# Patient Record
Sex: FEMALE | Race: OTHER | HISPANIC OR LATINO | URBAN - METROPOLITAN AREA
[De-identification: names, ages, dates, MRNs, and addresses within clinical notes are randomized per-mention and may not be internally consistent; named-entity substitution may affect disease eponyms.]

---

## 2020-04-06 ENCOUNTER — INPATIENT (INPATIENT)
Facility: HOSPITAL | Age: 70
LOS: 3 days | Discharge: ANOTHER HOSPITAL | DRG: 871 | End: 2020-04-10
Attending: STUDENT IN AN ORGANIZED HEALTH CARE EDUCATION/TRAINING PROGRAM | Admitting: STUDENT IN AN ORGANIZED HEALTH CARE EDUCATION/TRAINING PROGRAM
Payer: MEDICARE

## 2020-04-06 VITALS
HEART RATE: 116 BPM | OXYGEN SATURATION: 85 % | SYSTOLIC BLOOD PRESSURE: 151 MMHG | TEMPERATURE: 99 F | RESPIRATION RATE: 22 BRPM | DIASTOLIC BLOOD PRESSURE: 87 MMHG

## 2020-04-06 DIAGNOSIS — I10 ESSENTIAL (PRIMARY) HYPERTENSION: ICD-10-CM

## 2020-04-06 DIAGNOSIS — U07.1 COVID-19: ICD-10-CM

## 2020-04-06 DIAGNOSIS — J96.01 ACUTE RESPIRATORY FAILURE WITH HYPOXIA: ICD-10-CM

## 2020-04-06 DIAGNOSIS — Z29.9 ENCOUNTER FOR PROPHYLACTIC MEASURES, UNSPECIFIED: ICD-10-CM

## 2020-04-06 DIAGNOSIS — E11.9 TYPE 2 DIABETES MELLITUS WITHOUT COMPLICATIONS: ICD-10-CM

## 2020-04-06 LAB
ALBUMIN SERPL ELPH-MCNC: 3.2 G/DL — LOW (ref 3.3–5)
ALP SERPL-CCNC: 72 U/L — SIGNIFICANT CHANGE UP (ref 40–120)
ALT FLD-CCNC: 35 U/L — SIGNIFICANT CHANGE UP (ref 10–45)
ANION GAP SERPL CALC-SCNC: 16 MMOL/L — SIGNIFICANT CHANGE UP (ref 5–17)
APTT BLD: 26.2 SEC — LOW (ref 27.5–36.3)
AST SERPL-CCNC: 35 U/L — SIGNIFICANT CHANGE UP (ref 10–40)
BASE EXCESS BLDV CALC-SCNC: 0.2 MMOL/L — SIGNIFICANT CHANGE UP
BASOPHILS # BLD AUTO: 0.01 K/UL — SIGNIFICANT CHANGE UP (ref 0–0.2)
BASOPHILS NFR BLD AUTO: 0.1 % — SIGNIFICANT CHANGE UP (ref 0–2)
BILIRUB SERPL-MCNC: 0.6 MG/DL — SIGNIFICANT CHANGE UP (ref 0.2–1.2)
BUN SERPL-MCNC: 13 MG/DL — SIGNIFICANT CHANGE UP (ref 7–23)
CA-I SERPL-SCNC: 1.12 MMOL/L — SIGNIFICANT CHANGE UP (ref 1.12–1.3)
CALCIUM SERPL-MCNC: 8.8 MG/DL — SIGNIFICANT CHANGE UP (ref 8.4–10.5)
CHLORIDE SERPL-SCNC: 93 MMOL/L — LOW (ref 96–108)
CK SERPL-CCNC: 76 U/L — SIGNIFICANT CHANGE UP (ref 25–170)
CO2 SERPL-SCNC: 21 MMOL/L — LOW (ref 22–31)
CREAT SERPL-MCNC: 0.59 MG/DL — SIGNIFICANT CHANGE UP (ref 0.5–1.3)
CRP SERPL-MCNC: 20.95 MG/DL — HIGH (ref 0–0.4)
D DIMER BLD IA.RAPID-MCNC: 912 NG/ML DDU — HIGH
EOSINOPHIL # BLD AUTO: 0 K/UL — SIGNIFICANT CHANGE UP (ref 0–0.5)
EOSINOPHIL NFR BLD AUTO: 0 % — SIGNIFICANT CHANGE UP (ref 0–6)
ERYTHROCYTE [SEDIMENTATION RATE] IN BLOOD: 50 MM/HR — HIGH
FERRITIN SERPL-MCNC: 1122 NG/ML — HIGH (ref 15–150)
GAS PNL BLDV: 130 MMOL/L — LOW (ref 138–146)
GAS PNL BLDV: SIGNIFICANT CHANGE UP
GLUCOSE BLDC GLUCOMTR-MCNC: 312 MG/DL — HIGH (ref 70–99)
GLUCOSE BLDC GLUCOMTR-MCNC: 337 MG/DL — HIGH (ref 70–99)
GLUCOSE BLDC GLUCOMTR-MCNC: 347 MG/DL — HIGH (ref 70–99)
GLUCOSE SERPL-MCNC: 426 MG/DL — HIGH (ref 70–99)
HCO3 BLDV-SCNC: 23 MMOL/L — SIGNIFICANT CHANGE UP (ref 20–27)
HCT VFR BLD CALC: 45.1 % — HIGH (ref 34.5–45)
HGB BLD-MCNC: 14.8 G/DL — SIGNIFICANT CHANGE UP (ref 11.5–15.5)
IMM GRANULOCYTES NFR BLD AUTO: 0.5 % — SIGNIFICANT CHANGE UP (ref 0–1.5)
INR BLD: 1.15 — SIGNIFICANT CHANGE UP (ref 0.88–1.16)
LACTATE SERPL-SCNC: 2.1 MMOL/L — HIGH (ref 0.5–2)
LDH SERPL L TO P-CCNC: SIGNIFICANT CHANGE UP U/L (ref 50–242)
LYMPHOCYTES # BLD AUTO: 0.67 K/UL — LOW (ref 1–3.3)
LYMPHOCYTES # BLD AUTO: 6.9 % — LOW (ref 13–44)
MCHC RBC-ENTMCNC: 27.7 PG — SIGNIFICANT CHANGE UP (ref 27–34)
MCHC RBC-ENTMCNC: 32.8 GM/DL — SIGNIFICANT CHANGE UP (ref 32–36)
MCV RBC AUTO: 84.5 FL — SIGNIFICANT CHANGE UP (ref 80–100)
MONOCYTES # BLD AUTO: 0.16 K/UL — SIGNIFICANT CHANGE UP (ref 0–0.9)
MONOCYTES NFR BLD AUTO: 1.6 % — LOW (ref 2–14)
NEUTROPHILS # BLD AUTO: 8.87 K/UL — HIGH (ref 1.8–7.4)
NEUTROPHILS NFR BLD AUTO: 90.9 % — HIGH (ref 43–77)
NRBC # BLD: 0 /100 WBCS — SIGNIFICANT CHANGE UP (ref 0–0)
NT-PROBNP SERPL-SCNC: 270 PG/ML — SIGNIFICANT CHANGE UP (ref 0–300)
PCO2 BLDV: 33 MMHG — LOW (ref 41–51)
PH BLDV: 7.46 — HIGH (ref 7.32–7.43)
PLATELET # BLD AUTO: 228 K/UL — SIGNIFICANT CHANGE UP (ref 150–400)
PO2 BLDV: 34 MMHG — SIGNIFICANT CHANGE UP
POTASSIUM BLDV-SCNC: 3.9 MMOL/L — SIGNIFICANT CHANGE UP (ref 3.5–4.9)
POTASSIUM SERPL-MCNC: 4.5 MMOL/L — SIGNIFICANT CHANGE UP (ref 3.5–5.3)
POTASSIUM SERPL-SCNC: 4.5 MMOL/L — SIGNIFICANT CHANGE UP (ref 3.5–5.3)
PROT SERPL-MCNC: 7.1 G/DL — SIGNIFICANT CHANGE UP (ref 6–8.3)
PROTHROM AB SERPL-ACNC: 13.2 SEC — HIGH (ref 10–12.9)
RBC # BLD: 5.34 M/UL — HIGH (ref 3.8–5.2)
RBC # FLD: 12.8 % — SIGNIFICANT CHANGE UP (ref 10.3–14.5)
SAO2 % BLDV: 66 % — SIGNIFICANT CHANGE UP
SARS-COV-2 RNA SPEC QL NAA+PROBE: DETECTED
SODIUM SERPL-SCNC: 130 MMOL/L — LOW (ref 135–145)
TROPONIN T SERPL-MCNC: <0.01 NG/ML — SIGNIFICANT CHANGE UP (ref 0–0.01)
WBC # BLD: 9.76 K/UL — SIGNIFICANT CHANGE UP (ref 3.8–10.5)
WBC # FLD AUTO: 9.76 K/UL — SIGNIFICANT CHANGE UP (ref 3.8–10.5)

## 2020-04-06 PROCEDURE — 99285 EMERGENCY DEPT VISIT HI MDM: CPT | Mod: 25

## 2020-04-06 PROCEDURE — 99233 SBSQ HOSP IP/OBS HIGH 50: CPT | Mod: GC

## 2020-04-06 PROCEDURE — 71045 X-RAY EXAM CHEST 1 VIEW: CPT | Mod: 26

## 2020-04-06 PROCEDURE — 93010 ELECTROCARDIOGRAM REPORT: CPT

## 2020-04-06 RX ORDER — HYDROXYCHLOROQUINE SULFATE 200 MG
400 TABLET ORAL EVERY 12 HOURS
Refills: 0 | Status: COMPLETED | OUTPATIENT
Start: 2020-04-06 | End: 2020-04-07

## 2020-04-06 RX ORDER — DEXTROSE 50 % IN WATER 50 %
15 SYRINGE (ML) INTRAVENOUS ONCE
Refills: 0 | Status: DISCONTINUED | OUTPATIENT
Start: 2020-04-06 | End: 2020-04-10

## 2020-04-06 RX ORDER — INSULIN LISPRO 100/ML
VIAL (ML) SUBCUTANEOUS
Refills: 0 | Status: DISCONTINUED | OUTPATIENT
Start: 2020-04-06 | End: 2020-04-10

## 2020-04-06 RX ORDER — SODIUM CHLORIDE 9 MG/ML
500 INJECTION INTRAMUSCULAR; INTRAVENOUS; SUBCUTANEOUS ONCE
Refills: 0 | Status: COMPLETED | OUTPATIENT
Start: 2020-04-06 | End: 2020-04-06

## 2020-04-06 RX ORDER — FAMOTIDINE 10 MG/ML
40 INJECTION INTRAVENOUS
Refills: 0 | Status: DISCONTINUED | OUTPATIENT
Start: 2020-04-06 | End: 2020-04-06

## 2020-04-06 RX ORDER — ACETAMINOPHEN 500 MG
650 TABLET ORAL EVERY 4 HOURS
Refills: 0 | Status: DISCONTINUED | OUTPATIENT
Start: 2020-04-06 | End: 2020-04-10

## 2020-04-06 RX ORDER — AZITHROMYCIN 500 MG/1
500 TABLET, FILM COATED ORAL ONCE
Refills: 0 | Status: COMPLETED | OUTPATIENT
Start: 2020-04-06 | End: 2020-04-06

## 2020-04-06 RX ORDER — GLUCAGON INJECTION, SOLUTION 0.5 MG/.1ML
1 INJECTION, SOLUTION SUBCUTANEOUS ONCE
Refills: 0 | Status: DISCONTINUED | OUTPATIENT
Start: 2020-04-06 | End: 2020-04-10

## 2020-04-06 RX ORDER — HYDROXYCHLOROQUINE SULFATE 200 MG
TABLET ORAL
Refills: 0 | Status: DISCONTINUED | OUTPATIENT
Start: 2020-04-06 | End: 2020-04-10

## 2020-04-06 RX ORDER — ENOXAPARIN SODIUM 100 MG/ML
40 INJECTION SUBCUTANEOUS DAILY
Refills: 0 | Status: DISCONTINUED | OUTPATIENT
Start: 2020-04-06 | End: 2020-04-07

## 2020-04-06 RX ORDER — DEXTROSE 50 % IN WATER 50 %
25 SYRINGE (ML) INTRAVENOUS ONCE
Refills: 0 | Status: DISCONTINUED | OUTPATIENT
Start: 2020-04-06 | End: 2020-04-10

## 2020-04-06 RX ORDER — FAMOTIDINE 10 MG/ML
20 INJECTION INTRAVENOUS
Refills: 0 | Status: DISCONTINUED | OUTPATIENT
Start: 2020-04-06 | End: 2020-04-10

## 2020-04-06 RX ORDER — ACETAMINOPHEN 500 MG
650 TABLET ORAL ONCE
Refills: 0 | Status: COMPLETED | OUTPATIENT
Start: 2020-04-06 | End: 2020-04-06

## 2020-04-06 RX ORDER — DEXTROSE 50 % IN WATER 50 %
12.5 SYRINGE (ML) INTRAVENOUS ONCE
Refills: 0 | Status: DISCONTINUED | OUTPATIENT
Start: 2020-04-06 | End: 2020-04-10

## 2020-04-06 RX ORDER — AZITHROMYCIN 500 MG/1
250 TABLET, FILM COATED ORAL DAILY
Refills: 0 | Status: COMPLETED | OUTPATIENT
Start: 2020-04-07 | End: 2020-04-10

## 2020-04-06 RX ORDER — HYDROXYCHLOROQUINE SULFATE 200 MG
200 TABLET ORAL EVERY 12 HOURS
Refills: 0 | Status: DISCONTINUED | OUTPATIENT
Start: 2020-04-07 | End: 2020-04-10

## 2020-04-06 RX ORDER — AZITHROMYCIN 500 MG/1
250 TABLET, FILM COATED ORAL DAILY
Refills: 0 | Status: DISCONTINUED | OUTPATIENT
Start: 2020-04-06 | End: 2020-04-06

## 2020-04-06 RX ORDER — SODIUM CHLORIDE 9 MG/ML
1000 INJECTION, SOLUTION INTRAVENOUS
Refills: 0 | Status: DISCONTINUED | OUTPATIENT
Start: 2020-04-06 | End: 2020-04-10

## 2020-04-06 RX ADMIN — Medication 400 MILLIGRAM(S): at 19:28

## 2020-04-06 RX ADMIN — FAMOTIDINE 20 MILLIGRAM(S): 10 INJECTION INTRAVENOUS at 19:28

## 2020-04-06 RX ADMIN — AZITHROMYCIN 500 MILLIGRAM(S): 500 TABLET, FILM COATED ORAL at 19:46

## 2020-04-06 RX ADMIN — Medication 8: at 20:30

## 2020-04-06 RX ADMIN — Medication 650 MILLIGRAM(S): at 15:01

## 2020-04-06 RX ADMIN — SODIUM CHLORIDE 500 MILLILITER(S): 9 INJECTION INTRAMUSCULAR; INTRAVENOUS; SUBCUTANEOUS at 17:10

## 2020-04-06 RX ADMIN — SODIUM CHLORIDE 500 MILLILITER(S): 9 INJECTION INTRAMUSCULAR; INTRAVENOUS; SUBCUTANEOUS at 19:28

## 2020-04-06 RX ADMIN — AZITHROMYCIN 255 MILLIGRAM(S): 500 TABLET, FILM COATED ORAL at 17:10

## 2020-04-06 NOTE — H&P ADULT - HISTORY OF PRESENT ILLNESS
HPI:  70 yo F PMHx HTN and DM (not on any meds) presents to the ED with c/o 10 D of generalized weakness, loss of appetite, generalized malaise, and subjective fevers.  She also reports nonbloody diarrhea.    Date of onset of fever: 10 days  Date of onset of dyspnea:  Recent Travel:  Sick Contacts:  COVID Exposure:  Close Contacts: HPI:  70 yo F PMHx HTN and DM (not on any meds) presents to the ED with c/o 10 D of generalized weakness, loss of appetite, generalized malaise, and subjective fevers.  She also reports nonbloody diarrhea and abdominal discomfort.  Denies cough.    Date of onset of fever: 10 days  Date of onset of dyspnea: denies  Recent Travel: none  Sick Contacts: none  COVID Exposure: none

## 2020-04-06 NOTE — ED PROVIDER NOTE - DIAGNOSTIC INTERPRETATION
ER Physician: June Ree  CHEST XRAY INTERPRETATION: Air space opacity noted bilaterally. L upper lobe consolidation noted. Heart size normal. No acute bony injury. ER Physician: June Ree  CHEST XRAY INTERPRETATION: Air space opacities noted bilaterally. L upper lobe consolidation noted. Heart size normal. No acute bony injury.

## 2020-04-06 NOTE — H&P ADULT - ASSESSMENT
70 yo F PMHx HTN and DM (not on any meds) presents to the ED with c/o 10 D of generalized weakness, loss of appetite, generalized malaise, and subjective fevers admitted for hypoxic respiratory failure 2/2 COVID 19.

## 2020-04-06 NOTE — ED ADULT NURSE NOTE - OBJECTIVE STATEMENT
The pt is a 70 y/o female came in to ED for evaluation of fever, cough and SOB for one week. Pt noted to be hypoxic at triage O2sat 88 at room air, O2at improved 96% on 4L nasal canula.

## 2020-04-06 NOTE — H&P ADULT - NSHPLABSRESULTS_GEN_ALL_CORE
LABS:                         14.8   9.76  )-----------( 228      ( 06 Apr 2020 15:45 )             45.1     04-06    130<L>  |  93<L>  |  13  ----------------------------<  426<H>  4.5   |  21<L>  |  0.59    Ca    8.8      06 Apr 2020 15:45    TPro  7.1  /  Alb  3.2<L>  /  TBili  0.6  /  DBili  x   /  AST  35  /  ALT  35  /  AlkPhos  72  04-06    PT/INR - ( 06 Apr 2020 15:45 )   PT: 13.2 sec;   INR: 1.15          PTT - ( 06 Apr 2020 15:45 )  PTT:26.2 sec    CARDIAC MARKERS ( 06 Apr 2020 15:45 )  x     / <0.01 ng/mL / 76 U/L / x     / x          Serum Pro-Brain Natriuretic Peptide: 270 pg/mL (04-06 @ 15:45)    Lactate, Blood: 2.1 mmol/L (04-06 @ 15:45)      RADIOLOGY, EKG & ADDITIONAL TESTS: Reviewed.

## 2020-04-06 NOTE — H&P ADULT - PROBLEM SELECTOR PLAN 2
Currently comfortable on 3L NC   - Monitor for respiratory distress and O2 saturations  - Nasal Cannula as needed

## 2020-04-06 NOTE — H&P ADULT - PROBLEM SELECTOR PLAN 1
- Supportive care   - Tylenol 650mg for fevers   - Isolation precautions; droplet and contact    - f/u Procalcitonin, Quant, G6PD  - CRP 20.95, Ferritin 1122, DDimer 912  - Start Plaquenil 400mg BID for 2 doses then 200mg BID for 4  - Start Azithromycin 250mg for 5 days

## 2020-04-06 NOTE — ED PROVIDER NOTE - CLINICAL SUMMARY MEDICAL DECISION MAKING FREE TEXT BOX
70 y/o F PMHx HTN and DM (not on any meds) p/w 10 D of generalized weakness, loss of appetite, generalized malaise, and subjective fevers. Pt in ED febrile to 101.9F, mildly tachypneic, and hypoxic to 85% on RA. In triage pt came up to mid 90s on 4L of nasal canula. Plan is COVID workup and admission since pt hypoxic at rest. 70 y/o F PMHx HTN and pre-diabetes (not on any meds) p/w 10 days of generalized weakness, loss of appetite, generalized malaise, and subjective fevers. Pt in ED is febrile to 101.9F, mildly tachypneic, and hypoxic to 85% on RA. In triage pt came up to mid 90s on 4L of nasal canula. Plan is COVID workup and admission since pt hypoxic at rest.  ED course: Labs noted. CXR with b/l infiltrates and opacities. IV abx. given. COVID 19 sent. Pt admitted as she hypoxic to 85% on RA. O2 sats improved to mid 90s on 4L NC. Pt is not in any resp distress. Stable for admission to floor.

## 2020-04-06 NOTE — H&P ADULT - NSHPPHYSICALEXAM_GEN_ALL_CORE
.VITAL SIGNS:  T(C): 38.8 (04-06-20 @ 15:16), Max: 39.7 (04-06-20 @ 15:00)  T(F): 101.9 (04-06-20 @ 15:16), Max: 103.4 (04-06-20 @ 15:00)  HR: 105 (04-06-20 @ 15:16) (105 - 116)  BP: 133/83 (04-06-20 @ 15:16) (133/83 - 151/87)  BP(mean): --  RR: 20 (04-06-20 @ 15:16) (20 - 22)  SpO2: 95% (04-06-20 @ 15:16) (85% - 95%)  Wt(kg): --    PHYSICAL EXAM:    Constitutional: WDWN resting comfortably in bed; NAD  Head: NC/AT  Eyes: PERRL, EOMI, clear conjunctiva  ENT: no nasal discharge; uvula midline, no oropharyngeal erythema or exudates; MMM  Neck: supple; no JVD or thyromegaly  Respiratory: CTA B/L; no W/R/R, no retractions  Cardiac: +S1/S2; RRR; no M/R/G; PMI non-displaced  Gastrointestinal: soft, NT/ND; no rebound or guarding; +BSx4  Genitourinary: normal external genitalia  Back: spine midline, no bony tenderness or step-offs; no CVAT B/L  Extremities: WWP, no clubbing or cyanosis; no peripheral edema  Musculoskeletal: NROM x4; no joint swelling, tenderness or erythema  Vascular: 2+ radial, femoral, DP/PT pulses B/L  Dermatologic: skin warm, dry and intact; no rashes, wounds, or scars  Lymphatic: no submandibular or cervical LAD  Neurologic: AAOx3; CNII-XII grossly intact; no focal deficits  Psychiatric: affect and characteristics of appearance, verbalizations, behaviors are appropriate .VITAL SIGNS:  T(C): 38.8 (04-06-20 @ 15:16), Max: 39.7 (04-06-20 @ 15:00)  T(F): 101.9 (04-06-20 @ 15:16), Max: 103.4 (04-06-20 @ 15:00)  HR: 105 (04-06-20 @ 15:16) (105 - 116)  BP: 133/83 (04-06-20 @ 15:16) (133/83 - 151/87)  BP(mean): --  RR: 20 (04-06-20 @ 15:16) (20 - 22)  SpO2: 95% (04-06-20 @ 15:16) (85% - 95%)  Wt(kg): --    PHYSICAL EXAM:    Constitutional: Obese habitus, resting comfortably in bed; NAD  Head: NC/AT  Eyes: PERRL, EOMI, clear conjunctiva  ENT: no nasal discharge; uvula midline, no oropharyngeal erythema or exudates; MMM  Neck: supple; no JVD or thyromegaly  Respiratory: CTA B/L; no W/R/R, no retractions  Cardiac: +S1/S2; RRR; no M/R/G; PMI non-displaced  Gastrointestinal: soft, NT/ND; no rebound or guarding; +BSx4  Genitourinary: normal external genitalia  Back: spine midline, no bony tenderness or step-offs; no CVAT B/L  Extremities: WWP, no clubbing or cyanosis; no peripheral edema  Musculoskeletal: NROM x4; no joint swelling, tenderness or erythema  Vascular: 2+ radial, femoral, DP/PT pulses B/L  Dermatologic: skin warm, dry and intact; no rashes, wounds, or scars  Lymphatic: no submandibular or cervical LAD  Neurologic: AAOx3; CNII-XII grossly intact; no focal deficits  Psychiatric: affect and characteristics of appearance, verbalizations, behaviors are appropriate

## 2020-04-07 LAB
ALBUMIN SERPL ELPH-MCNC: 3.1 G/DL — LOW (ref 3.3–5)
ALP SERPL-CCNC: 69 U/L — SIGNIFICANT CHANGE UP (ref 40–120)
ALT FLD-CCNC: 29 U/L — SIGNIFICANT CHANGE UP (ref 10–45)
ANION GAP SERPL CALC-SCNC: 13 MMOL/L — SIGNIFICANT CHANGE UP (ref 5–17)
AST SERPL-CCNC: 31 U/L — SIGNIFICANT CHANGE UP (ref 10–40)
BASOPHILS # BLD AUTO: 0 K/UL — SIGNIFICANT CHANGE UP (ref 0–0.2)
BASOPHILS NFR BLD AUTO: 0 % — SIGNIFICANT CHANGE UP (ref 0–2)
BILIRUB SERPL-MCNC: 0.6 MG/DL — SIGNIFICANT CHANGE UP (ref 0.2–1.2)
BUN SERPL-MCNC: 12 MG/DL — SIGNIFICANT CHANGE UP (ref 7–23)
CALCIUM SERPL-MCNC: 8.8 MG/DL — SIGNIFICANT CHANGE UP (ref 8.4–10.5)
CHLORIDE SERPL-SCNC: 98 MMOL/L — SIGNIFICANT CHANGE UP (ref 96–108)
CO2 SERPL-SCNC: 24 MMOL/L — SIGNIFICANT CHANGE UP (ref 22–31)
CREAT SERPL-MCNC: 0.53 MG/DL — SIGNIFICANT CHANGE UP (ref 0.5–1.3)
CRP SERPL-MCNC: 21.06 MG/DL — HIGH (ref 0–0.4)
D DIMER BLD IA.RAPID-MCNC: 832 NG/ML DDU — HIGH
EOSINOPHIL # BLD AUTO: 0 K/UL — SIGNIFICANT CHANGE UP (ref 0–0.5)
EOSINOPHIL NFR BLD AUTO: 0 % — SIGNIFICANT CHANGE UP (ref 0–6)
FERRITIN SERPL-MCNC: 1066 NG/ML — HIGH (ref 15–150)
GLUCOSE BLDC GLUCOMTR-MCNC: 202 MG/DL — HIGH (ref 70–99)
GLUCOSE BLDC GLUCOMTR-MCNC: 281 MG/DL — HIGH (ref 70–99)
GLUCOSE BLDC GLUCOMTR-MCNC: 287 MG/DL — HIGH (ref 70–99)
GLUCOSE BLDC GLUCOMTR-MCNC: 362 MG/DL — HIGH (ref 70–99)
GLUCOSE SERPL-MCNC: 338 MG/DL — HIGH (ref 70–99)
HBA1C BLD-MCNC: 13.2 % — HIGH (ref 4–5.6)
HCT VFR BLD CALC: 43.5 % — SIGNIFICANT CHANGE UP (ref 34.5–45)
HCV AB S/CO SERPL IA: 0.14 S/CO — SIGNIFICANT CHANGE UP
HCV AB SERPL-IMP: SIGNIFICANT CHANGE UP
HGB BLD-MCNC: 14.1 G/DL — SIGNIFICANT CHANGE UP (ref 11.5–15.5)
IMM GRANULOCYTES NFR BLD AUTO: 0.6 % — SIGNIFICANT CHANGE UP (ref 0–1.5)
LYMPHOCYTES # BLD AUTO: 1.42 K/UL — SIGNIFICANT CHANGE UP (ref 1–3.3)
LYMPHOCYTES # BLD AUTO: 14.1 % — SIGNIFICANT CHANGE UP (ref 13–44)
MAGNESIUM SERPL-MCNC: 2.4 MG/DL — SIGNIFICANT CHANGE UP (ref 1.6–2.6)
MCHC RBC-ENTMCNC: 28.1 PG — SIGNIFICANT CHANGE UP (ref 27–34)
MCHC RBC-ENTMCNC: 32.4 GM/DL — SIGNIFICANT CHANGE UP (ref 32–36)
MCV RBC AUTO: 86.7 FL — SIGNIFICANT CHANGE UP (ref 80–100)
MONOCYTES # BLD AUTO: 0.2 K/UL — SIGNIFICANT CHANGE UP (ref 0–0.9)
MONOCYTES NFR BLD AUTO: 2 % — SIGNIFICANT CHANGE UP (ref 2–14)
NEUTROPHILS # BLD AUTO: 8.38 K/UL — HIGH (ref 1.8–7.4)
NEUTROPHILS NFR BLD AUTO: 83.3 % — HIGH (ref 43–77)
NRBC # BLD: 0 /100 WBCS — SIGNIFICANT CHANGE UP (ref 0–0)
PHOSPHATE SERPL-MCNC: 2.1 MG/DL — LOW (ref 2.5–4.5)
PLATELET # BLD AUTO: 254 K/UL — SIGNIFICANT CHANGE UP (ref 150–400)
POTASSIUM SERPL-MCNC: 3.7 MMOL/L — SIGNIFICANT CHANGE UP (ref 3.5–5.3)
POTASSIUM SERPL-SCNC: 3.7 MMOL/L — SIGNIFICANT CHANGE UP (ref 3.5–5.3)
PROCALCITONIN SERPL-MCNC: 0.35 NG/ML — HIGH (ref 0.02–0.1)
PROT SERPL-MCNC: 6.6 G/DL — SIGNIFICANT CHANGE UP (ref 6–8.3)
RBC # BLD: 5.02 M/UL — SIGNIFICANT CHANGE UP (ref 3.8–5.2)
RBC # FLD: 13.1 % — SIGNIFICANT CHANGE UP (ref 10.3–14.5)
SODIUM SERPL-SCNC: 135 MMOL/L — SIGNIFICANT CHANGE UP (ref 135–145)
WBC # BLD: 10.06 K/UL — SIGNIFICANT CHANGE UP (ref 3.8–10.5)
WBC # FLD AUTO: 10.06 K/UL — SIGNIFICANT CHANGE UP (ref 3.8–10.5)

## 2020-04-07 PROCEDURE — 99233 SBSQ HOSP IP/OBS HIGH 50: CPT | Mod: GC

## 2020-04-07 RX ORDER — HUMAN INSULIN 100 [IU]/ML
10 INJECTION, SUSPENSION SUBCUTANEOUS ONCE
Refills: 0 | Status: DISCONTINUED | OUTPATIENT
Start: 2020-04-07 | End: 2020-04-07

## 2020-04-07 RX ORDER — POTASSIUM CHLORIDE 20 MEQ
20 PACKET (EA) ORAL ONCE
Refills: 0 | Status: COMPLETED | OUTPATIENT
Start: 2020-04-07 | End: 2020-04-07

## 2020-04-07 RX ORDER — HUMAN INSULIN 100 [IU]/ML
8 INJECTION, SUSPENSION SUBCUTANEOUS ONCE
Refills: 0 | Status: COMPLETED | OUTPATIENT
Start: 2020-04-07 | End: 2020-04-07

## 2020-04-07 RX ORDER — INSULIN GLARGINE 100 [IU]/ML
18 INJECTION, SOLUTION SUBCUTANEOUS AT BEDTIME
Refills: 0 | Status: DISCONTINUED | OUTPATIENT
Start: 2020-04-07 | End: 2020-04-08

## 2020-04-07 RX ORDER — ENOXAPARIN SODIUM 100 MG/ML
40 INJECTION SUBCUTANEOUS EVERY 12 HOURS
Refills: 0 | Status: DISCONTINUED | OUTPATIENT
Start: 2020-04-07 | End: 2020-04-10

## 2020-04-07 RX ORDER — ACETAMINOPHEN 500 MG
325 TABLET ORAL ONCE
Refills: 0 | Status: COMPLETED | OUTPATIENT
Start: 2020-04-07 | End: 2020-04-07

## 2020-04-07 RX ORDER — TOCILIZUMAB 20 MG/ML
400 INJECTION, SOLUTION, CONCENTRATE INTRAVENOUS ONCE
Refills: 0 | Status: COMPLETED | OUTPATIENT
Start: 2020-04-07 | End: 2020-04-07

## 2020-04-07 RX ADMIN — HUMAN INSULIN 8 UNIT(S): 100 INJECTION, SUSPENSION SUBCUTANEOUS at 11:23

## 2020-04-07 RX ADMIN — INSULIN GLARGINE 18 UNIT(S): 100 INJECTION, SOLUTION SUBCUTANEOUS at 22:34

## 2020-04-07 RX ADMIN — TOCILIZUMAB 100 MILLIGRAM(S): 20 INJECTION, SOLUTION, CONCENTRATE INTRAVENOUS at 18:32

## 2020-04-07 RX ADMIN — Medication 6: at 09:07

## 2020-04-07 RX ADMIN — Medication 650 MILLIGRAM(S): at 04:56

## 2020-04-07 RX ADMIN — Medication 6: at 13:10

## 2020-04-07 RX ADMIN — Medication 400 MILLIGRAM(S): at 07:50

## 2020-04-07 RX ADMIN — Medication 40 MILLIGRAM(S): at 14:42

## 2020-04-07 RX ADMIN — ENOXAPARIN SODIUM 40 MILLIGRAM(S): 100 INJECTION SUBCUTANEOUS at 20:59

## 2020-04-07 RX ADMIN — ENOXAPARIN SODIUM 40 MILLIGRAM(S): 100 INJECTION SUBCUTANEOUS at 11:23

## 2020-04-07 RX ADMIN — Medication 650 MILLIGRAM(S): at 14:38

## 2020-04-07 RX ADMIN — FAMOTIDINE 20 MILLIGRAM(S): 10 INJECTION INTRAVENOUS at 07:50

## 2020-04-07 RX ADMIN — Medication 20 MILLIEQUIVALENT(S): at 17:35

## 2020-04-07 RX ADMIN — Medication 325 MILLIGRAM(S): at 15:00

## 2020-04-07 RX ADMIN — Medication 4: at 17:35

## 2020-04-07 RX ADMIN — AZITHROMYCIN 250 MILLIGRAM(S): 500 TABLET, FILM COATED ORAL at 11:23

## 2020-04-07 RX ADMIN — FAMOTIDINE 20 MILLIGRAM(S): 10 INJECTION INTRAVENOUS at 17:36

## 2020-04-07 RX ADMIN — Medication 200 MILLIGRAM(S): at 18:10

## 2020-04-07 RX ADMIN — Medication 10: at 22:34

## 2020-04-07 NOTE — PROGRESS NOTE ADULT - SUBJECTIVE AND OBJECTIVE BOX
OVERNIGHT EVENTS: None    SUBJECTIVE: Patient seen and examined at the bedside. She says she is feeling better and had 1 episode of diarrhea overnight.    Vital Signs Last 12 Hrs  T(F): 101.7 (04-07-20 @ 04:54), Max: 101.7 (04-07-20 @ 04:54)  HR: 92 (04-07-20 @ 04:54) (73 - 92)  BP: 161/80 (04-07-20 @ 04:54) (130/75 - 161/80)  BP(mean): --  RR: 18 (04-07-20 @ 04:54) (18 - 18)  SpO2: 95% (04-07-20 @ 04:54) (94% - 95%)  I&O's Summary      PHYSICAL EXAM:  General: In no acute distress, resting comfortably in bed, on 3L NC  HEENT: NCAT, PERRL, EOMI, MMM  Neck: No JVD  Respiratory: Clear to auscultation bilaterally with no wheezes, rales, or rhonchi  Cardiovascular: RRR, normal S1 and S2  Vascular: 2+ radial and DP pulses  Abdomen: Soft, NT/ND.  Extremities: Warm and well perfused.  Skin: No gross skin abnormalities or rashes  Neuro: AAOx3. Strength and sensation intact throughout.    LABS:                        14.1   10.06 )-----------( 254      ( 07 Apr 2020 12:41 )             43.5     04-06    130<L>  |  93<L>  |  13  ----------------------------<  426<H>  4.5   |  21<L>  |  0.59    Ca    8.8      06 Apr 2020 15:45    TPro  7.1  /  Alb  3.2<L>  /  TBili  0.6  /  DBili  x   /  AST  35  /  ALT  35  /  AlkPhos  72  04-06    PT/INR - ( 06 Apr 2020 15:45 )   PT: 13.2 sec;   INR: 1.15          PTT - ( 06 Apr 2020 15:45 )  PTT:26.2 sec      RADIOLOGY & ADDITIONAL TESTS: Reviewed.    MEDICATIONS  (STANDING):  azithromycin   Tablet 250 milliGRAM(s) Oral daily  dextrose 5%. 1000 milliLiter(s) (50 mL/Hr) IV Continuous <Continuous>  dextrose 50% Injectable 12.5 Gram(s) IV Push once  dextrose 50% Injectable 25 Gram(s) IV Push once  dextrose 50% Injectable 25 Gram(s) IV Push once  enoxaparin Injectable 40 milliGRAM(s) SubCutaneous every 12 hours  famotidine    Tablet 20 milliGRAM(s) Oral two times a day  hydroxychloroquine 200 milliGRAM(s) Oral every 12 hours  hydroxychloroquine   Oral   insulin glargine Injectable (LANTUS) 18 Unit(s) SubCutaneous at bedtime  insulin lispro (HumaLOG) corrective regimen sliding scale   SubCutaneous Before meals and at bedtime    MEDICATIONS  (PRN):  acetaminophen   Tablet .. 650 milliGRAM(s) Oral every 4 hours PRN Temp greater or equal to 38.5C (101.3F)  dextrose 40% Gel 15 Gram(s) Oral once PRN Blood Glucose LESS THAN 70 milliGRAM(s)/deciliter  glucagon  Injectable 1 milliGRAM(s) IntraMuscular once PRN Glucose LESS THAN 70 milligrams/deciliter      Allergies    No Known Allergies    Intolerances

## 2020-04-07 NOTE — PROGRESS NOTE ADULT - PROBLEM SELECTOR PLAN 4
History of DM not on medications. Multiple episodes of hyperglycemia so far.    -Follow up HbA1C  -MISS

## 2020-04-07 NOTE — PROGRESS NOTE ADULT - PROBLEM SELECTOR PLAN 1
Presented with weakness, loss of appetite, fevers, and diarrhea. Swabbed and positive for COVID-19.      -COVID-19 PCR status = Positive 4/6  -Azithromycin 250mg QD x5 doses started 4/6  -Plaquenil 400mg BID x2 doses then 200mg BID x8 doses started 4/6  -Tylenol/Albuterol/Pepcid/Lovenox/Supplemental O2  -Daily CBC/CMP/Mg/PO4/CRP/Ferritin/D-Dimer  -CRP = 20.95*   -Ferritin = 1122*   -D-Dimer = 912 -> 832   -Quantiferon = Ordered   -G6PD = Ordered

## 2020-04-07 NOTE — PROGRESS NOTE ADULT - PROBLEM SELECTOR PLAN 5
F: None  E: Replete PRN  N: DASH/TLC  GI PPX: Pepcid  DVT PPX: Lovenox  Code: Full  Dispo: COVID RMF

## 2020-04-08 DIAGNOSIS — A41.9 SEPSIS, UNSPECIFIED ORGANISM: ICD-10-CM

## 2020-04-08 LAB
ALBUMIN SERPL ELPH-MCNC: 2.9 G/DL — LOW (ref 3.3–5)
ALP SERPL-CCNC: 67 U/L — SIGNIFICANT CHANGE UP (ref 40–120)
ALT FLD-CCNC: 27 U/L — SIGNIFICANT CHANGE UP (ref 10–45)
ANION GAP SERPL CALC-SCNC: 14 MMOL/L — SIGNIFICANT CHANGE UP (ref 5–17)
AST SERPL-CCNC: 24 U/L — SIGNIFICANT CHANGE UP (ref 10–40)
BASOPHILS # BLD AUTO: 0.01 K/UL — SIGNIFICANT CHANGE UP (ref 0–0.2)
BASOPHILS NFR BLD AUTO: 0.1 % — SIGNIFICANT CHANGE UP (ref 0–2)
BILIRUB SERPL-MCNC: 0.5 MG/DL — SIGNIFICANT CHANGE UP (ref 0.2–1.2)
BUN SERPL-MCNC: 14 MG/DL — SIGNIFICANT CHANGE UP (ref 7–23)
CALCIUM SERPL-MCNC: 9 MG/DL — SIGNIFICANT CHANGE UP (ref 8.4–10.5)
CHLORIDE SERPL-SCNC: 101 MMOL/L — SIGNIFICANT CHANGE UP (ref 96–108)
CO2 SERPL-SCNC: 22 MMOL/L — SIGNIFICANT CHANGE UP (ref 22–31)
CREAT SERPL-MCNC: 0.48 MG/DL — LOW (ref 0.5–1.3)
CRP SERPL-MCNC: 17.23 MG/DL — HIGH (ref 0–0.4)
D DIMER BLD IA.RAPID-MCNC: 532 NG/ML DDU — HIGH
EOSINOPHIL # BLD AUTO: 0 K/UL — SIGNIFICANT CHANGE UP (ref 0–0.5)
EOSINOPHIL NFR BLD AUTO: 0 % — SIGNIFICANT CHANGE UP (ref 0–6)
FERRITIN SERPL-MCNC: 1158 NG/ML — HIGH (ref 15–150)
GLUCOSE BLDC GLUCOMTR-MCNC: 229 MG/DL — HIGH (ref 70–99)
GLUCOSE BLDC GLUCOMTR-MCNC: 271 MG/DL — HIGH (ref 70–99)
GLUCOSE BLDC GLUCOMTR-MCNC: 276 MG/DL — HIGH (ref 70–99)
GLUCOSE BLDC GLUCOMTR-MCNC: 292 MG/DL — HIGH (ref 70–99)
GLUCOSE SERPL-MCNC: 291 MG/DL — HIGH (ref 70–99)
HBA1C BLD-MCNC: 12.8 % — HIGH (ref 4–5.6)
HCT VFR BLD CALC: 43.4 % — SIGNIFICANT CHANGE UP (ref 34.5–45)
HGB BLD-MCNC: 14.1 G/DL — SIGNIFICANT CHANGE UP (ref 11.5–15.5)
IMM GRANULOCYTES NFR BLD AUTO: 0.6 % — SIGNIFICANT CHANGE UP (ref 0–1.5)
LYMPHOCYTES # BLD AUTO: 1.01 K/UL — SIGNIFICANT CHANGE UP (ref 1–3.3)
LYMPHOCYTES # BLD AUTO: 12.1 % — LOW (ref 13–44)
MAGNESIUM SERPL-MCNC: 2.4 MG/DL — SIGNIFICANT CHANGE UP (ref 1.6–2.6)
MCHC RBC-ENTMCNC: 27.9 PG — SIGNIFICANT CHANGE UP (ref 27–34)
MCHC RBC-ENTMCNC: 32.5 GM/DL — SIGNIFICANT CHANGE UP (ref 32–36)
MCV RBC AUTO: 85.8 FL — SIGNIFICANT CHANGE UP (ref 80–100)
MONOCYTES # BLD AUTO: 0.11 K/UL — SIGNIFICANT CHANGE UP (ref 0–0.9)
MONOCYTES NFR BLD AUTO: 1.3 % — LOW (ref 2–14)
NEUTROPHILS # BLD AUTO: 7.16 K/UL — SIGNIFICANT CHANGE UP (ref 1.8–7.4)
NEUTROPHILS NFR BLD AUTO: 85.9 % — HIGH (ref 43–77)
NRBC # BLD: 0 /100 WBCS — SIGNIFICANT CHANGE UP (ref 0–0)
PHOSPHATE SERPL-MCNC: 3 MG/DL — SIGNIFICANT CHANGE UP (ref 2.5–4.5)
PLATELET # BLD AUTO: 302 K/UL — SIGNIFICANT CHANGE UP (ref 150–400)
POTASSIUM SERPL-MCNC: 3.8 MMOL/L — SIGNIFICANT CHANGE UP (ref 3.5–5.3)
POTASSIUM SERPL-SCNC: 3.8 MMOL/L — SIGNIFICANT CHANGE UP (ref 3.5–5.3)
PROT SERPL-MCNC: 6.6 G/DL — SIGNIFICANT CHANGE UP (ref 6–8.3)
RBC # BLD: 5.06 M/UL — SIGNIFICANT CHANGE UP (ref 3.8–5.2)
RBC # FLD: 13 % — SIGNIFICANT CHANGE UP (ref 10.3–14.5)
SODIUM SERPL-SCNC: 137 MMOL/L — SIGNIFICANT CHANGE UP (ref 135–145)
WBC # BLD: 8.34 K/UL — SIGNIFICANT CHANGE UP (ref 3.8–10.5)
WBC # FLD AUTO: 8.34 K/UL — SIGNIFICANT CHANGE UP (ref 3.8–10.5)

## 2020-04-08 PROCEDURE — 99233 SBSQ HOSP IP/OBS HIGH 50: CPT | Mod: GC

## 2020-04-08 PROCEDURE — 93010 ELECTROCARDIOGRAM REPORT: CPT

## 2020-04-08 RX ORDER — INSULIN GLARGINE 100 [IU]/ML
22 INJECTION, SOLUTION SUBCUTANEOUS AT BEDTIME
Refills: 0 | Status: DISCONTINUED | OUTPATIENT
Start: 2020-04-08 | End: 2020-04-09

## 2020-04-08 RX ORDER — POTASSIUM CHLORIDE 20 MEQ
20 PACKET (EA) ORAL ONCE
Refills: 0 | Status: COMPLETED | OUTPATIENT
Start: 2020-04-08 | End: 2020-04-08

## 2020-04-08 RX ORDER — BENZOCAINE AND MENTHOL 5; 1 G/100ML; G/100ML
1 LIQUID ORAL THREE TIMES A DAY
Refills: 0 | Status: DISCONTINUED | OUTPATIENT
Start: 2020-04-08 | End: 2020-04-10

## 2020-04-08 RX ORDER — INSULIN LISPRO 100/ML
5 VIAL (ML) SUBCUTANEOUS
Refills: 0 | Status: DISCONTINUED | OUTPATIENT
Start: 2020-04-08 | End: 2020-04-09

## 2020-04-08 RX ADMIN — FAMOTIDINE 20 MILLIGRAM(S): 10 INJECTION INTRAVENOUS at 18:43

## 2020-04-08 RX ADMIN — Medication 200 MILLIGRAM(S): at 18:43

## 2020-04-08 RX ADMIN — Medication 20 MILLIEQUIVALENT(S): at 12:51

## 2020-04-08 RX ADMIN — Medication 4: at 22:41

## 2020-04-08 RX ADMIN — Medication 6: at 12:51

## 2020-04-08 RX ADMIN — AZITHROMYCIN 250 MILLIGRAM(S): 500 TABLET, FILM COATED ORAL at 12:51

## 2020-04-08 RX ADMIN — ENOXAPARIN SODIUM 40 MILLIGRAM(S): 100 INJECTION SUBCUTANEOUS at 06:19

## 2020-04-08 RX ADMIN — Medication 6: at 18:42

## 2020-04-08 RX ADMIN — Medication 200 MILLIGRAM(S): at 06:19

## 2020-04-08 RX ADMIN — FAMOTIDINE 20 MILLIGRAM(S): 10 INJECTION INTRAVENOUS at 06:19

## 2020-04-08 RX ADMIN — BENZOCAINE AND MENTHOL 1 LOZENGE: 5; 1 LIQUID ORAL at 13:17

## 2020-04-08 RX ADMIN — INSULIN GLARGINE 22 UNIT(S): 100 INJECTION, SOLUTION SUBCUTANEOUS at 22:41

## 2020-04-08 RX ADMIN — Medication 5 UNIT(S): at 18:42

## 2020-04-08 RX ADMIN — Medication 650 MILLIGRAM(S): at 13:17

## 2020-04-08 RX ADMIN — Medication 5 UNIT(S): at 12:50

## 2020-04-08 RX ADMIN — Medication 40 MILLIGRAM(S): at 18:43

## 2020-04-08 RX ADMIN — Medication 40 MILLIGRAM(S): at 06:19

## 2020-04-08 RX ADMIN — Medication 6: at 09:06

## 2020-04-08 RX ADMIN — ENOXAPARIN SODIUM 40 MILLIGRAM(S): 100 INJECTION SUBCUTANEOUS at 18:43

## 2020-04-08 NOTE — PROGRESS NOTE ADULT - PROBLEM SELECTOR PLAN 3
Not on home meds, currently mildly elevated.    -Continue to monitor 2/2 COVID-19    Continue supplemental oxygen as needed

## 2020-04-08 NOTE — PROGRESS NOTE ADULT - SUBJECTIVE AND OBJECTIVE BOX
OVERNIGHT EVENTS: Received Tocilizumab and Solu-Medrol overnight.    SUBJECTIVE: Patient seen and examined at the bedside. She has a sore throat but says her breathing feels better.    Vital Signs Last 12 Hrs  T(F): 98.1 (04-08-20 @ 13:30), Max: 98.3 (04-08-20 @ 06:46)  HR: 82 (04-08-20 @ 13:30) (75 - 91)  BP: 160/85 (04-08-20 @ 13:30) (127/73 - 160/85)  BP(mean): --  RR: 20 (04-08-20 @ 13:30) (20 - 20)  SpO2: 91% (04-08-20 @ 13:30) (91% - 97%)  I&O's Summary      PHYSICAL EXAM:  General: In no acute distress, resting comfortably in bed, on 6L NC  HEENT: NCAT, PERRL, EOMI, MMM  Neck: No JVD  Respiratory: Not tachypneic. Speaking in full sentences, in no distress.  Cardiovascular: RRR, normal S1 and S2  Vascular: 2+ radial and DP pulses  Abdomen: Soft, NT/ND.  Extremities: Warm and well perfused.  Skin: No gross skin abnormalities or rashes  Neuro: AAOx3. Strength and sensation intact throughout.    LABS:                        14.1   8.34  )-----------( 302      ( 08 Apr 2020 08:58 )             43.4     04-08    137  |  101  |  14  ----------------------------<  291<H>  3.8   |  22  |  0.48<L>    Ca    9.0      08 Apr 2020 08:58  Phos  3.0     04-08  Mg     2.4     04-08    TPro  6.6  /  Alb  2.9<L>  /  TBili  0.5  /  DBili  x   /  AST  24  /  ALT  27  /  AlkPhos  67  04-08    PT/INR - ( 06 Apr 2020 15:45 )   PT: 13.2 sec;   INR: 1.15          PTT - ( 06 Apr 2020 15:45 )  PTT:26.2 sec      RADIOLOGY & ADDITIONAL TESTS: Reviewed.    MEDICATIONS  (STANDING):  azithromycin   Tablet 250 milliGRAM(s) Oral daily  dextrose 5%. 1000 milliLiter(s) (50 mL/Hr) IV Continuous <Continuous>  dextrose 50% Injectable 12.5 Gram(s) IV Push once  dextrose 50% Injectable 25 Gram(s) IV Push once  dextrose 50% Injectable 25 Gram(s) IV Push once  enoxaparin Injectable 40 milliGRAM(s) SubCutaneous every 12 hours  famotidine    Tablet 20 milliGRAM(s) Oral two times a day  hydroxychloroquine 200 milliGRAM(s) Oral every 12 hours  hydroxychloroquine   Oral   insulin glargine Injectable (LANTUS) 22 Unit(s) SubCutaneous at bedtime  insulin lispro (HumaLOG) corrective regimen sliding scale   SubCutaneous Before meals and at bedtime  insulin lispro Injectable (HumaLOG) 5 Unit(s) SubCutaneous three times a day before meals  methylPREDNISolone sodium succinate Injectable 40 milliGRAM(s) IV Push every 12 hours    MEDICATIONS  (PRN):  acetaminophen   Tablet .. 650 milliGRAM(s) Oral every 4 hours PRN Temp greater or equal to 38.5C (101.3F)  benzocaine 15 mG/menthol 3.6 mG (Sugar-Free) Lozenge 1 Lozenge Oral three times a day PRN Sore Throat  dextrose 40% Gel 15 Gram(s) Oral once PRN Blood Glucose LESS THAN 70 milliGRAM(s)/deciliter  glucagon  Injectable 1 milliGRAM(s) IntraMuscular once PRN Glucose LESS THAN 70 milligrams/deciliter      Allergies    No Known Allergies    Intolerances

## 2020-04-08 NOTE — PROGRESS NOTE ADULT - PROBLEM SELECTOR PLAN 1
Presented with weakness, loss of appetite, fevers, and diarrhea. Swabbed and positive for COVID-19.      -COVID-19 PCR status = Positive 4/6  -Azithromycin 250mg QD x5 doses started 4/6  -Plaquenil 400mg BID x2 doses then 200mg BID x8 doses started 4/6  -Tylenol/Albuterol/Pepcid/Lovenox/Supplemental O2  -Daily CBC/CMP/Mg/PO4/CRP/Ferritin/D-Dimer  -CRP = 20.95*   -Ferritin = 1122*   -D-Dimer = 912 -> 832   -Quantiferon = Ordered   -G6PD = Ordered Patient met SIRS on admission with lactate 2.1 with source of COVID    -Treatment as below

## 2020-04-08 NOTE — PROGRESS NOTE ADULT - PROBLEM SELECTOR PLAN 4
History of DM not on medications. Multiple episodes of hyperglycemia so far.    -Follow up HbA1C  -MISS Not on home meds, currently mildly elevated.    -Continue to monitor

## 2020-04-08 NOTE — PROGRESS NOTE ADULT - PROBLEM SELECTOR PLAN 5
F: None  E: Replete PRN  N: DASH/TLC  GI PPX: Pepcid  DVT PPX: Lovenox  Code: Full  Dispo: COVID RMF History of DM not on medications. Multiple episodes of hyperglycemia so far.    -Follow up HbA1C  -MISS

## 2020-04-08 NOTE — PROGRESS NOTE ADULT - PROBLEM SELECTOR PLAN 2
2/2 COVID-19    Continue supplemental oxygen as needed Presented with weakness, loss of appetite, fevers, and diarrhea. Swabbed and positive for COVID-19.      -COVID-19 PCR status = Positive 4/6  -Azithromycin 250mg QD x5 doses started 4/6  -Plaquenil 400mg BID x2 doses then 200mg BID x8 doses started 4/6  -Tylenol/Albuterol/Pepcid/Lovenox/Supplemental O2  -Daily CBC/CMP/Mg/PO4/CRP/Ferritin/D-Dimer  -CRP = 20.95*   -Ferritin = 1122*   -D-Dimer = 912 -> 832   -Quantiferon = Ordered   -G6PD = Ordered

## 2020-04-09 LAB
ALBUMIN SERPL ELPH-MCNC: 3.1 G/DL — LOW (ref 3.3–5)
ALP SERPL-CCNC: 92 U/L — SIGNIFICANT CHANGE UP (ref 40–120)
ALT FLD-CCNC: 29 U/L — SIGNIFICANT CHANGE UP (ref 10–45)
ANION GAP SERPL CALC-SCNC: 13 MMOL/L — SIGNIFICANT CHANGE UP (ref 5–17)
AST SERPL-CCNC: 25 U/L — SIGNIFICANT CHANGE UP (ref 10–40)
BASOPHILS # BLD AUTO: 0.01 K/UL — SIGNIFICANT CHANGE UP (ref 0–0.2)
BASOPHILS NFR BLD AUTO: 0.1 % — SIGNIFICANT CHANGE UP (ref 0–2)
BILIRUB SERPL-MCNC: 0.7 MG/DL — SIGNIFICANT CHANGE UP (ref 0.2–1.2)
BUN SERPL-MCNC: 15 MG/DL — SIGNIFICANT CHANGE UP (ref 7–23)
CALCIUM SERPL-MCNC: 9.2 MG/DL — SIGNIFICANT CHANGE UP (ref 8.4–10.5)
CHLORIDE SERPL-SCNC: 102 MMOL/L — SIGNIFICANT CHANGE UP (ref 96–108)
CO2 SERPL-SCNC: 23 MMOL/L — SIGNIFICANT CHANGE UP (ref 22–31)
CREAT SERPL-MCNC: 0.47 MG/DL — LOW (ref 0.5–1.3)
CRP SERPL-MCNC: 7.99 MG/DL — HIGH (ref 0–0.4)
D DIMER BLD IA.RAPID-MCNC: 605 NG/ML DDU — HIGH
EOSINOPHIL # BLD AUTO: 0 K/UL — SIGNIFICANT CHANGE UP (ref 0–0.5)
EOSINOPHIL NFR BLD AUTO: 0 % — SIGNIFICANT CHANGE UP (ref 0–6)
FERRITIN SERPL-MCNC: 1313 NG/ML — HIGH (ref 15–150)
G6PD RBC-CCNC: 11.3 U/G HGB — SIGNIFICANT CHANGE UP (ref 7–20.5)
GLUCOSE BLDC GLUCOMTR-MCNC: 220 MG/DL — HIGH (ref 70–99)
GLUCOSE BLDC GLUCOMTR-MCNC: 269 MG/DL — HIGH (ref 70–99)
GLUCOSE BLDC GLUCOMTR-MCNC: 269 MG/DL — HIGH (ref 70–99)
GLUCOSE BLDC GLUCOMTR-MCNC: 314 MG/DL — HIGH (ref 70–99)
GLUCOSE SERPL-MCNC: 323 MG/DL — HIGH (ref 70–99)
HCT VFR BLD CALC: 43 % — SIGNIFICANT CHANGE UP (ref 34.5–45)
HGB BLD-MCNC: 14.3 G/DL — SIGNIFICANT CHANGE UP (ref 11.5–15.5)
IMM GRANULOCYTES NFR BLD AUTO: 0.6 % — SIGNIFICANT CHANGE UP (ref 0–1.5)
LYMPHOCYTES # BLD AUTO: 1.18 K/UL — SIGNIFICANT CHANGE UP (ref 1–3.3)
LYMPHOCYTES # BLD AUTO: 10.7 % — LOW (ref 13–44)
MAGNESIUM SERPL-MCNC: 2.3 MG/DL — SIGNIFICANT CHANGE UP (ref 1.6–2.6)
MCHC RBC-ENTMCNC: 28 PG — SIGNIFICANT CHANGE UP (ref 27–34)
MCHC RBC-ENTMCNC: 33.3 GM/DL — SIGNIFICANT CHANGE UP (ref 32–36)
MCV RBC AUTO: 84.3 FL — SIGNIFICANT CHANGE UP (ref 80–100)
MONOCYTES # BLD AUTO: 0.24 K/UL — SIGNIFICANT CHANGE UP (ref 0–0.9)
MONOCYTES NFR BLD AUTO: 2.2 % — SIGNIFICANT CHANGE UP (ref 2–14)
NEUTROPHILS # BLD AUTO: 9.49 K/UL — HIGH (ref 1.8–7.4)
NEUTROPHILS NFR BLD AUTO: 86.4 % — HIGH (ref 43–77)
NRBC # BLD: 0 /100 WBCS — SIGNIFICANT CHANGE UP (ref 0–0)
PHOSPHATE SERPL-MCNC: 3.7 MG/DL — SIGNIFICANT CHANGE UP (ref 2.5–4.5)
PLATELET # BLD AUTO: 387 K/UL — SIGNIFICANT CHANGE UP (ref 150–400)
POTASSIUM SERPL-MCNC: 4.1 MMOL/L — SIGNIFICANT CHANGE UP (ref 3.5–5.3)
POTASSIUM SERPL-SCNC: 4.1 MMOL/L — SIGNIFICANT CHANGE UP (ref 3.5–5.3)
PROT SERPL-MCNC: 7 G/DL — SIGNIFICANT CHANGE UP (ref 6–8.3)
RBC # BLD: 5.1 M/UL — SIGNIFICANT CHANGE UP (ref 3.8–5.2)
RBC # FLD: 12.9 % — SIGNIFICANT CHANGE UP (ref 10.3–14.5)
SODIUM SERPL-SCNC: 138 MMOL/L — SIGNIFICANT CHANGE UP (ref 135–145)
WBC # BLD: 10.99 K/UL — HIGH (ref 3.8–10.5)
WBC # FLD AUTO: 10.99 K/UL — HIGH (ref 3.8–10.5)

## 2020-04-09 PROCEDURE — 99233 SBSQ HOSP IP/OBS HIGH 50: CPT | Mod: GC

## 2020-04-09 RX ORDER — INSULIN LISPRO 100/ML
8 VIAL (ML) SUBCUTANEOUS
Refills: 0 | Status: DISCONTINUED | OUTPATIENT
Start: 2020-04-09 | End: 2020-04-10

## 2020-04-09 RX ORDER — INSULIN GLARGINE 100 [IU]/ML
30 INJECTION, SOLUTION SUBCUTANEOUS AT BEDTIME
Refills: 0 | Status: DISCONTINUED | OUTPATIENT
Start: 2020-04-09 | End: 2020-04-10

## 2020-04-09 RX ORDER — INSULIN LISPRO 100/ML
6 VIAL (ML) SUBCUTANEOUS
Refills: 0 | Status: DISCONTINUED | OUTPATIENT
Start: 2020-04-09 | End: 2020-04-09

## 2020-04-09 RX ORDER — INSULIN GLARGINE 100 [IU]/ML
28 INJECTION, SOLUTION SUBCUTANEOUS AT BEDTIME
Refills: 0 | Status: DISCONTINUED | OUTPATIENT
Start: 2020-04-09 | End: 2020-04-09

## 2020-04-09 RX ADMIN — Medication 4: at 17:39

## 2020-04-09 RX ADMIN — Medication 40 MILLIGRAM(S): at 05:55

## 2020-04-09 RX ADMIN — Medication 5 UNIT(S): at 08:49

## 2020-04-09 RX ADMIN — Medication 200 MILLIGRAM(S): at 05:57

## 2020-04-09 RX ADMIN — FAMOTIDINE 20 MILLIGRAM(S): 10 INJECTION INTRAVENOUS at 05:55

## 2020-04-09 RX ADMIN — ENOXAPARIN SODIUM 40 MILLIGRAM(S): 100 INJECTION SUBCUTANEOUS at 05:55

## 2020-04-09 RX ADMIN — Medication 40 MILLIGRAM(S): at 17:39

## 2020-04-09 RX ADMIN — Medication 6: at 12:30

## 2020-04-09 RX ADMIN — Medication 6 UNIT(S): at 12:30

## 2020-04-09 RX ADMIN — Medication 200 MILLIGRAM(S): at 17:43

## 2020-04-09 RX ADMIN — AZITHROMYCIN 250 MILLIGRAM(S): 500 TABLET, FILM COATED ORAL at 12:33

## 2020-04-09 RX ADMIN — Medication 6 UNIT(S): at 17:39

## 2020-04-09 RX ADMIN — ENOXAPARIN SODIUM 40 MILLIGRAM(S): 100 INJECTION SUBCUTANEOUS at 17:42

## 2020-04-09 RX ADMIN — Medication 8: at 22:06

## 2020-04-09 RX ADMIN — FAMOTIDINE 20 MILLIGRAM(S): 10 INJECTION INTRAVENOUS at 17:39

## 2020-04-09 RX ADMIN — Medication 6: at 08:48

## 2020-04-09 NOTE — PROGRESS NOTE ADULT - PROBLEM SELECTOR PLAN 5
History of DM not on medications. Multiple episodes of hyperglycemia so far.    -HbA1C 13.2, c/w Diabetes   -lispro increased to 6u with meals today, obtained lantus 22u last night, plan to increase tonight based on insulin requirements today

## 2020-04-09 NOTE — PROGRESS NOTE ADULT - ATTENDING COMMENTS
Pt. seen and examined by me earlier today; I have read Dr. Burnette's note, I agree w/ his findings and plan of care as documented; Pt. febrile, hypoxic, CRP > 20; case d/w Pulm-CCM, will give Pt. IV steroids and tocilizumab
Patient was seen and examined with the resident team today.  I agree with the above assessment and plan with the following exceptions/additions:     Briefly, this is a 68yo woman, mostly Ukrainian-speaking, with a PMH of HTN and DM (not on any meds) who initially p/w various constitutional symptoms, as well as diarrhea and abdominal discomfort, found to have acute hypoxic respiratory failure with sepsis 2/2 COVID-19.  She had a slight decompensation on 4/7 requiring steroids and Tocilizumab.  Now on 4-6L NC but w/o respiratory complaints other than fatigue.  Hospital course also notable for hyperglycemia.     -- c/w steroids x 5 days total  -- please change Plaquenil to 400mg daily dosing, rather than 200mg BID  -- wean supplemental O2 as able   -- EP following, appreciate assistance   -- glycemic control   -- encourage IS and OOB to chair as able  -- DVT PPx - Lovenox  -- Dispo - TBD     Angelina Bañuelos  [phone]
Pt. seen and examined by housestaff; I have read Dr. Burnette's note, I agree w/ his findings and plan of care as documented; Pt. clinically-improving, cont. steroids (day #2/5). wean off O2 as tolerated

## 2020-04-09 NOTE — PROGRESS NOTE ADULT - PROBLEM SELECTOR PLAN 4
APPT INFO    Date /Time: 2/15/18   Reason for Appt: Hemorrhoids/Rectal Bleeding   Ref Provider/Clinic: Dr Hernandez, Primary Care   Are there internal records? Yes/No?  IF YES, list clinic names: Yes  See Above   Are there outside records? Yes/No? No   Patient Contact (Y/N) & Call Details: No referred   Action: Reviewed records; Records are in EPIC     OUTSIDE RECORDS CHECKLIST     CLINIC NAME COMMENTS REC (x) IMG (x)                        Not on home meds, currently mildly elevated.    -Continue to monitor

## 2020-04-09 NOTE — PROGRESS NOTE ADULT - SUBJECTIVE AND OBJECTIVE BOX
OVERNIGHT EVENTS: No acute events overnight.    SUBJECTIVE/INTERVAL HPI: Patient was seen and examined at bedside.    VITALS  Vital Signs Last 24 Hrs  T(C): 36.7 (09 Apr 2020 05:50), Max: 36.7 (08 Apr 2020 13:30)  T(F): 98.1 (09 Apr 2020 05:50), Max: 98.1 (08 Apr 2020 13:30)  HR: 72 (09 Apr 2020 05:50) (72 - 82)  BP: 142/80 (09 Apr 2020 05:50) (142/80 - 160/85)  BP(mean): --  RR: 20 (09 Apr 2020 05:50) (20 - 20)  SpO2: 96% (09 Apr 2020 05:50) (91% - 96%)    I&O's Summary      CAPILLARY BLOOD GLUCOSE      POCT Blood Glucose.: 269 mg/dL (09 Apr 2020 08:23)  POCT Blood Glucose.: 229 mg/dL (08 Apr 2020 22:37)  POCT Blood Glucose.: 271 mg/dL (08 Apr 2020 18:15)  POCT Blood Glucose.: 292 mg/dL (08 Apr 2020 12:46)      PHYSICAL EXAM      MEDICATIONS  (STANDING):  azithromycin   Tablet 250 milliGRAM(s) Oral daily  dextrose 5%. 1000 milliLiter(s) (50 mL/Hr) IV Continuous <Continuous>  dextrose 50% Injectable 12.5 Gram(s) IV Push once  dextrose 50% Injectable 25 Gram(s) IV Push once  dextrose 50% Injectable 25 Gram(s) IV Push once  enoxaparin Injectable 40 milliGRAM(s) SubCutaneous every 12 hours  famotidine    Tablet 20 milliGRAM(s) Oral two times a day  hydroxychloroquine 200 milliGRAM(s) Oral every 12 hours  hydroxychloroquine   Oral   insulin glargine Injectable (LANTUS) 22 Unit(s) SubCutaneous at bedtime  insulin lispro (HumaLOG) corrective regimen sliding scale   SubCutaneous Before meals and at bedtime  insulin lispro Injectable (HumaLOG) 5 Unit(s) SubCutaneous three times a day before meals  methylPREDNISolone sodium succinate Injectable 40 milliGRAM(s) IV Push every 12 hours    MEDICATIONS  (PRN):  acetaminophen   Tablet .. 650 milliGRAM(s) Oral every 4 hours PRN Temp greater or equal to 38.5C (101.3F)  benzocaine 15 mG/menthol 3.6 mG (Sugar-Free) Lozenge 1 Lozenge Oral three times a day PRN Sore Throat  dextrose 40% Gel 15 Gram(s) Oral once PRN Blood Glucose LESS THAN 70 milliGRAM(s)/deciliter  glucagon  Injectable 1 milliGRAM(s) IntraMuscular once PRN Glucose LESS THAN 70 milligrams/deciliter      LABS                        14.3   10.99 )-----------( 387      ( 09 Apr 2020 07:37 )             43.0     04-09    138  |  102  |  15  ----------------------------<  323<H>  4.1   |  23  |  0.47<L>    Ca    9.2      09 Apr 2020 07:37  Phos  3.7     04-09  Mg     2.3     04-09    TPro  7.0  /  Alb  3.1<L>  /  TBili  0.7  /  DBili  x   /  AST  25  /  ALT  29  /  AlkPhos  92  04-09    LIVER FUNCTIONS - ( 09 Apr 2020 07:37 )  Alb: 3.1 g/dL / Pro: 7.0 g/dL / ALK PHOS: 92 U/L / ALT: 29 U/L / AST: 25 U/L / GGT: x                     Radiology and other tests: Reviewed. OVERNIGHT EVENTS: No acute events overnight.    SUBJECTIVE/INTERVAL HPI: Patient was seen and examined at bedside this morning.   States that she feels improved. Denies SOB or cough. Continues to feel weak. Normal appetite and BM. Denies fever/chills.     VITALS  Vital Signs Last 24 Hrs  T(C): 36.7 (09 Apr 2020 05:50), Max: 36.7 (08 Apr 2020 13:30)  T(F): 98.1 (09 Apr 2020 05:50), Max: 98.1 (08 Apr 2020 13:30)  HR: 72 (09 Apr 2020 05:50) (72 - 82)  BP: 142/80 (09 Apr 2020 05:50) (142/80 - 160/85)  BP(mean): --  RR: 20 (09 Apr 2020 05:50) (20 - 20)  SpO2: 96% (09 Apr 2020 05:50) (91% - 96%)    I&O's Summary      CAPILLARY BLOOD GLUCOSE      POCT Blood Glucose.: 269 mg/dL (09 Apr 2020 08:23)  POCT Blood Glucose.: 229 mg/dL (08 Apr 2020 22:37)  POCT Blood Glucose.: 271 mg/dL (08 Apr 2020 18:15)  POCT Blood Glucose.: 292 mg/dL (08 Apr 2020 12:46)      PHYSICAL EXAM  General : comfortable, NAD, resting in bed  Resp: normal work of breathing, normal effort  Abd: soft, non tender, no rebound/guarding  ext: No edema/erythema/tenderness    MEDICATIONS  (STANDING):  azithromycin   Tablet 250 milliGRAM(s) Oral daily  dextrose 5%. 1000 milliLiter(s) (50 mL/Hr) IV Continuous <Continuous>  dextrose 50% Injectable 12.5 Gram(s) IV Push once  dextrose 50% Injectable 25 Gram(s) IV Push once  dextrose 50% Injectable 25 Gram(s) IV Push once  enoxaparin Injectable 40 milliGRAM(s) SubCutaneous every 12 hours  famotidine    Tablet 20 milliGRAM(s) Oral two times a day  hydroxychloroquine 200 milliGRAM(s) Oral every 12 hours  hydroxychloroquine   Oral   insulin glargine Injectable (LANTUS) 22 Unit(s) SubCutaneous at bedtime  insulin lispro (HumaLOG) corrective regimen sliding scale   SubCutaneous Before meals and at bedtime  insulin lispro Injectable (HumaLOG) 5 Unit(s) SubCutaneous three times a day before meals  methylPREDNISolone sodium succinate Injectable 40 milliGRAM(s) IV Push every 12 hours    MEDICATIONS  (PRN):  acetaminophen   Tablet .. 650 milliGRAM(s) Oral every 4 hours PRN Temp greater or equal to 38.5C (101.3F)  benzocaine 15 mG/menthol 3.6 mG (Sugar-Free) Lozenge 1 Lozenge Oral three times a day PRN Sore Throat  dextrose 40% Gel 15 Gram(s) Oral once PRN Blood Glucose LESS THAN 70 milliGRAM(s)/deciliter  glucagon  Injectable 1 milliGRAM(s) IntraMuscular once PRN Glucose LESS THAN 70 milligrams/deciliter      LABS                        14.3   10.99 )-----------( 387      ( 09 Apr 2020 07:37 )             43.0     04-09    138  |  102  |  15  ----------------------------<  323<H>  4.1   |  23  |  0.47<L>    Ca    9.2      09 Apr 2020 07:37  Phos  3.7     04-09  Mg     2.3     04-09    TPro  7.0  /  Alb  3.1<L>  /  TBili  0.7  /  DBili  x   /  AST  25  /  ALT  29  /  AlkPhos  92  04-09    LIVER FUNCTIONS - ( 09 Apr 2020 07:37 )  Alb: 3.1 g/dL / Pro: 7.0 g/dL / ALK PHOS: 92 U/L / ALT: 29 U/L / AST: 25 U/L / GGT: x                     Radiology and other tests: Reviewed.

## 2020-04-09 NOTE — PROGRESS NOTE ADULT - PROBLEM SELECTOR PLAN 3
2/2 COVID-19    Continue supplemental oxygen as needed, dec O2 supplementation to 4L NC, monitor O2 saturation

## 2020-04-09 NOTE — PROGRESS NOTE ADULT - PROBLEM SELECTOR PLAN 2
Presented with weakness, loss of appetite, fevers, and diarrhea. Swabbed and positive for COVID-19.      -COVID-19 PCR status = Positive 4/6  -Azithromycin 250mg QD x5 doses started 4/6  -Plaquenil 400mg BID x2 doses then 200mg BID x8 doses started 4/6  - Toci 4/7, Steriods 4/7   -Tylenol/Pepcid/Lovenox/Supplemental O2-dec to 4L today   -Daily CBC/CMP/Mg/PO4/CRP/Ferritin/D-Dimer  -CRP = 20.95>17.23>7.99  -Ferritin = 1122>1066>1158>1313  -D-Dimer = 912 -> 832 >532>605  -Quantiferon = Ordered   -G6PD = Ordered

## 2020-04-10 ENCOUNTER — TRANSCRIPTION ENCOUNTER (OUTPATIENT)
Age: 70
End: 2020-04-10

## 2020-04-10 VITALS
DIASTOLIC BLOOD PRESSURE: 68 MMHG | TEMPERATURE: 98 F | OXYGEN SATURATION: 91 % | SYSTOLIC BLOOD PRESSURE: 133 MMHG | RESPIRATION RATE: 20 BRPM | HEART RATE: 70 BPM

## 2020-04-10 LAB
ALBUMIN SERPL ELPH-MCNC: 3 G/DL — LOW (ref 3.3–5)
ALP SERPL-CCNC: 95 U/L — SIGNIFICANT CHANGE UP (ref 40–120)
ALT FLD-CCNC: 25 U/L — SIGNIFICANT CHANGE UP (ref 10–45)
ANION GAP SERPL CALC-SCNC: 11 MMOL/L — SIGNIFICANT CHANGE UP (ref 5–17)
AST SERPL-CCNC: 24 U/L — SIGNIFICANT CHANGE UP (ref 10–40)
BASOPHILS # BLD AUTO: 0.01 K/UL — SIGNIFICANT CHANGE UP (ref 0–0.2)
BASOPHILS NFR BLD AUTO: 0.1 % — SIGNIFICANT CHANGE UP (ref 0–2)
BILIRUB SERPL-MCNC: 0.6 MG/DL — SIGNIFICANT CHANGE UP (ref 0.2–1.2)
BUN SERPL-MCNC: 14 MG/DL — SIGNIFICANT CHANGE UP (ref 7–23)
CALCIUM SERPL-MCNC: 9.2 MG/DL — SIGNIFICANT CHANGE UP (ref 8.4–10.5)
CHLORIDE SERPL-SCNC: 102 MMOL/L — SIGNIFICANT CHANGE UP (ref 96–108)
CO2 SERPL-SCNC: 24 MMOL/L — SIGNIFICANT CHANGE UP (ref 22–31)
CREAT SERPL-MCNC: 0.48 MG/DL — LOW (ref 0.5–1.3)
CRP SERPL-MCNC: 3.66 MG/DL — HIGH (ref 0–0.4)
CULTURE RESULTS: NO GROWTH — SIGNIFICANT CHANGE UP
CULTURE RESULTS: NO GROWTH — SIGNIFICANT CHANGE UP
D DIMER BLD IA.RAPID-MCNC: 371 NG/ML DDU — HIGH
EOSINOPHIL # BLD AUTO: 0 K/UL — SIGNIFICANT CHANGE UP (ref 0–0.5)
EOSINOPHIL NFR BLD AUTO: 0 % — SIGNIFICANT CHANGE UP (ref 0–6)
FERRITIN SERPL-MCNC: 1309 NG/ML — HIGH (ref 15–150)
GLUCOSE BLDC GLUCOMTR-MCNC: 141 MG/DL — HIGH (ref 70–99)
GLUCOSE BLDC GLUCOMTR-MCNC: 159 MG/DL — HIGH (ref 70–99)
GLUCOSE SERPL-MCNC: 163 MG/DL — HIGH (ref 70–99)
HCT VFR BLD CALC: 45.4 % — HIGH (ref 34.5–45)
HGB BLD-MCNC: 14.5 G/DL — SIGNIFICANT CHANGE UP (ref 11.5–15.5)
IMM GRANULOCYTES NFR BLD AUTO: 0.7 % — SIGNIFICANT CHANGE UP (ref 0–1.5)
LYMPHOCYTES # BLD AUTO: 1.51 K/UL — SIGNIFICANT CHANGE UP (ref 1–3.3)
LYMPHOCYTES # BLD AUTO: 17.6 % — SIGNIFICANT CHANGE UP (ref 13–44)
MAGNESIUM SERPL-MCNC: 2.3 MG/DL — SIGNIFICANT CHANGE UP (ref 1.6–2.6)
MCHC RBC-ENTMCNC: 27.4 PG — SIGNIFICANT CHANGE UP (ref 27–34)
MCHC RBC-ENTMCNC: 31.9 GM/DL — LOW (ref 32–36)
MCV RBC AUTO: 85.7 FL — SIGNIFICANT CHANGE UP (ref 80–100)
MONOCYTES # BLD AUTO: 0.33 K/UL — SIGNIFICANT CHANGE UP (ref 0–0.9)
MONOCYTES NFR BLD AUTO: 3.8 % — SIGNIFICANT CHANGE UP (ref 2–14)
NEUTROPHILS # BLD AUTO: 6.67 K/UL — SIGNIFICANT CHANGE UP (ref 1.8–7.4)
NEUTROPHILS NFR BLD AUTO: 77.8 % — HIGH (ref 43–77)
NRBC # BLD: 0 /100 WBCS — SIGNIFICANT CHANGE UP (ref 0–0)
PHOSPHATE SERPL-MCNC: 4 MG/DL — SIGNIFICANT CHANGE UP (ref 2.5–4.5)
PLATELET # BLD AUTO: 439 K/UL — HIGH (ref 150–400)
POTASSIUM SERPL-MCNC: 3.7 MMOL/L — SIGNIFICANT CHANGE UP (ref 3.5–5.3)
POTASSIUM SERPL-SCNC: 3.7 MMOL/L — SIGNIFICANT CHANGE UP (ref 3.5–5.3)
PROT SERPL-MCNC: 6.6 G/DL — SIGNIFICANT CHANGE UP (ref 6–8.3)
RBC # BLD: 5.3 M/UL — HIGH (ref 3.8–5.2)
RBC # FLD: 12.6 % — SIGNIFICANT CHANGE UP (ref 10.3–14.5)
SODIUM SERPL-SCNC: 137 MMOL/L — SIGNIFICANT CHANGE UP (ref 135–145)
SPECIMEN SOURCE: SIGNIFICANT CHANGE UP
SPECIMEN SOURCE: SIGNIFICANT CHANGE UP
WBC # BLD: 8.58 K/UL — SIGNIFICANT CHANGE UP (ref 3.8–10.5)
WBC # FLD AUTO: 8.58 K/UL — SIGNIFICANT CHANGE UP (ref 3.8–10.5)

## 2020-04-10 PROCEDURE — 87635 SARS-COV-2 COVID-19 AMP PRB: CPT

## 2020-04-10 PROCEDURE — 36415 COLL VENOUS BLD VENIPUNCTURE: CPT

## 2020-04-10 PROCEDURE — 80053 COMPREHEN METABOLIC PANEL: CPT

## 2020-04-10 PROCEDURE — 71045 X-RAY EXAM CHEST 1 VIEW: CPT

## 2020-04-10 PROCEDURE — 85025 COMPLETE CBC W/AUTO DIFF WBC: CPT

## 2020-04-10 PROCEDURE — 99285 EMERGENCY DEPT VISIT HI MDM: CPT | Mod: 25

## 2020-04-10 PROCEDURE — 99239 HOSP IP/OBS DSCHRG MGMT >30: CPT | Mod: GC

## 2020-04-10 PROCEDURE — 82955 ASSAY OF G6PD ENZYME: CPT

## 2020-04-10 PROCEDURE — 86803 HEPATITIS C AB TEST: CPT

## 2020-04-10 PROCEDURE — 83615 LACTATE (LD) (LDH) ENZYME: CPT

## 2020-04-10 PROCEDURE — 84484 ASSAY OF TROPONIN QUANT: CPT

## 2020-04-10 PROCEDURE — 97161 PT EVAL LOW COMPLEX 20 MIN: CPT

## 2020-04-10 PROCEDURE — 85730 THROMBOPLASTIN TIME PARTIAL: CPT

## 2020-04-10 PROCEDURE — 83880 ASSAY OF NATRIURETIC PEPTIDE: CPT

## 2020-04-10 PROCEDURE — 86140 C-REACTIVE PROTEIN: CPT

## 2020-04-10 PROCEDURE — 85610 PROTHROMBIN TIME: CPT

## 2020-04-10 PROCEDURE — 82728 ASSAY OF FERRITIN: CPT

## 2020-04-10 PROCEDURE — 84145 PROCALCITONIN (PCT): CPT

## 2020-04-10 PROCEDURE — 82550 ASSAY OF CK (CPK): CPT

## 2020-04-10 PROCEDURE — 96374 THER/PROPH/DIAG INJ IV PUSH: CPT

## 2020-04-10 PROCEDURE — 82330 ASSAY OF CALCIUM: CPT

## 2020-04-10 PROCEDURE — 85652 RBC SED RATE AUTOMATED: CPT

## 2020-04-10 PROCEDURE — 93005 ELECTROCARDIOGRAM TRACING: CPT

## 2020-04-10 PROCEDURE — 83036 HEMOGLOBIN GLYCOSYLATED A1C: CPT

## 2020-04-10 PROCEDURE — 87040 BLOOD CULTURE FOR BACTERIA: CPT

## 2020-04-10 PROCEDURE — 84295 ASSAY OF SERUM SODIUM: CPT

## 2020-04-10 PROCEDURE — 82803 BLOOD GASES ANY COMBINATION: CPT

## 2020-04-10 PROCEDURE — 83605 ASSAY OF LACTIC ACID: CPT

## 2020-04-10 PROCEDURE — 83735 ASSAY OF MAGNESIUM: CPT

## 2020-04-10 PROCEDURE — 85379 FIBRIN DEGRADATION QUANT: CPT

## 2020-04-10 PROCEDURE — 84100 ASSAY OF PHOSPHORUS: CPT

## 2020-04-10 PROCEDURE — 84132 ASSAY OF SERUM POTASSIUM: CPT

## 2020-04-10 PROCEDURE — 82962 GLUCOSE BLOOD TEST: CPT

## 2020-04-10 RX ORDER — HYDROXYCHLOROQUINE SULFATE 200 MG
200 TABLET ORAL ONCE
Refills: 0 | Status: COMPLETED | OUTPATIENT
Start: 2020-04-10 | End: 2020-04-10

## 2020-04-10 RX ORDER — ENOXAPARIN SODIUM 100 MG/ML
40 INJECTION SUBCUTANEOUS
Qty: 400 | Refills: 0
Start: 2020-04-10 | End: 2020-04-14

## 2020-04-10 RX ORDER — POTASSIUM CHLORIDE 20 MEQ
20 PACKET (EA) ORAL ONCE
Refills: 0 | Status: COMPLETED | OUTPATIENT
Start: 2020-04-10 | End: 2020-04-10

## 2020-04-10 RX ORDER — AZITHROMYCIN 500 MG/1
1 TABLET, FILM COATED ORAL
Qty: 1 | Refills: 0
Start: 2020-04-10 | End: 2020-04-10

## 2020-04-10 RX ORDER — FAMOTIDINE 10 MG/ML
1 INJECTION INTRAVENOUS
Qty: 10 | Refills: 0
Start: 2020-04-10 | End: 2020-04-14

## 2020-04-10 RX ORDER — HYDROXYCHLOROQUINE SULFATE 200 MG
2 TABLET ORAL
Qty: 2 | Refills: 0
Start: 2020-04-10 | End: 2020-04-10

## 2020-04-10 RX ORDER — INSULIN GLARGINE 100 [IU]/ML
28 INJECTION, SOLUTION SUBCUTANEOUS AT BEDTIME
Refills: 0 | Status: DISCONTINUED | OUTPATIENT
Start: 2020-04-10 | End: 2020-04-10

## 2020-04-10 RX ORDER — INSULIN GLARGINE 100 [IU]/ML
28 INJECTION, SOLUTION SUBCUTANEOUS ONCE
Refills: 0 | Status: COMPLETED | OUTPATIENT
Start: 2020-04-10 | End: 2020-04-10

## 2020-04-10 RX ORDER — INSULIN LISPRO 100/ML
8 VIAL (ML) SUBCUTANEOUS
Qty: 3 | Refills: 0
Start: 2020-04-10 | End: 2020-04-14

## 2020-04-10 RX ORDER — HYDROXYCHLOROQUINE SULFATE 200 MG
400 TABLET ORAL ONCE
Refills: 0 | Status: DISCONTINUED | OUTPATIENT
Start: 2020-04-11 | End: 2020-04-10

## 2020-04-10 RX ORDER — ACETAMINOPHEN 500 MG
2 TABLET ORAL
Qty: 0 | Refills: 0 | DISCHARGE
Start: 2020-04-10

## 2020-04-10 RX ORDER — INSULIN GLARGINE 100 [IU]/ML
28 INJECTION, SOLUTION SUBCUTANEOUS
Qty: 10 | Refills: 0
Start: 2020-04-10 | End: 2020-04-14

## 2020-04-10 RX ADMIN — Medication 40 MILLIGRAM(S): at 06:52

## 2020-04-10 RX ADMIN — Medication 20 MILLIEQUIVALENT(S): at 13:07

## 2020-04-10 RX ADMIN — ENOXAPARIN SODIUM 40 MILLIGRAM(S): 100 INJECTION SUBCUTANEOUS at 06:51

## 2020-04-10 RX ADMIN — Medication 200 MILLIGRAM(S): at 13:07

## 2020-04-10 RX ADMIN — Medication 8 UNIT(S): at 09:29

## 2020-04-10 RX ADMIN — INSULIN GLARGINE 28 UNIT(S): 100 INJECTION, SOLUTION SUBCUTANEOUS at 02:34

## 2020-04-10 RX ADMIN — Medication 8 UNIT(S): at 13:08

## 2020-04-10 RX ADMIN — Medication 200 MILLIGRAM(S): at 06:51

## 2020-04-10 RX ADMIN — Medication 2: at 13:08

## 2020-04-10 RX ADMIN — AZITHROMYCIN 250 MILLIGRAM(S): 500 TABLET, FILM COATED ORAL at 13:24

## 2020-04-10 RX ADMIN — FAMOTIDINE 20 MILLIGRAM(S): 10 INJECTION INTRAVENOUS at 06:51

## 2020-04-10 NOTE — PROGRESS NOTE ADULT - PROBLEM SELECTOR PLAN 2
Presented with weakness, loss of appetite, fevers, and diarrhea. Swabbed and positive for COVID-19.      -COVID-19 PCR status = Positive 4/6  -Azithromycin 250mg QD x5 doses started 4/6  -Plaquenil 400mg BID x2 doses then 200mg BID x8 doses started 4/6- transition to 400mg qd   - Toci 4/7, Steriods 4/7 - 5 days of steroids   -Tylenol/Pepcid/Lovenox/Supplemental O2-dec to 2L today   -Daily CBC/CMP/Mg/PO4/CRP/Ferritin/D-Dimer  -CRP = 20.95>17.23>7.99>3.66  -Ferritin = 1122>1066>1158>1313>1309  -D-Dimer = 912 -> 832 >532>605>371  -Quantiferon = Ordered   -G6PD = Ordered

## 2020-04-10 NOTE — PROGRESS NOTE ADULT - PROBLEM SELECTOR PLAN 5
History of DM not on medications. Multiple episodes of hyperglycemia so far.    -HbA1C 13.2, c/w Diabetes   -lispro increased to 8u with meals and  lantus 28u last night, continue to monitor FS and insulin teaching today   - will need outpatient follow up

## 2020-04-10 NOTE — CHART NOTE - NSCHARTNOTEFT_GEN_A_CORE
Admitting Diagnosis:   Patient is a 69y old  Female who presents with a chief complaint of COVID 19, Hypoxic Respiratory Failure (10 Apr 2020 10:06)      Consult: Yes [   ]  No [ x  ]    Reason for Initial Nutrition Assessment: LOS       PAST MEDICAL & SURGICAL HISTORY:  DM (diabetes mellitus)  HTN (hypertension)      Current Nutrition Order: DASH/TLC, Consistent CHO     PO Intake: Fair ~50%    GI Issues: pt with no recent complaints of n/v/d/c     Pain: pt with no recent complaints of pain     Skin Integrity: intact     Labs:   04-10    137  |  102  |  14  ----------------------------<  163<H>  3.7   |  24  |  0.48<L>    Ca    9.2      10 Apr 2020 07:32  Phos  4.0     04-10  Mg     2.3     04-10    TPro  6.6  /  Alb  3.0<L>  /  TBili  0.6  /  DBili  x   /  AST  24  /  ALT  25  /  AlkPhos  95  04-10    CAPILLARY BLOOD GLUCOSE      POCT Blood Glucose.: 141 mg/dL (10 Apr 2020 08:42)  POCT Blood Glucose.: 314 mg/dL (09 Apr 2020 21:12)  POCT Blood Glucose.: 220 mg/dL (09 Apr 2020 17:17)    Nutritionally Pertinent Lab Values: (4/10) CRP 3.66 (down-trending), POCT 141, 314, 220, 269 (likely steroid-induced in conjunction with previously uncontrolled DM), (4/8) A1c 12.8    Medications:  MEDICATIONS  (STANDING):  azithromycin   Tablet 250 milliGRAM(s) Oral daily  dextrose 5%. 1000 milliLiter(s) (50 mL/Hr) IV Continuous <Continuous>  dextrose 50% Injectable 12.5 Gram(s) IV Push once  dextrose 50% Injectable 25 Gram(s) IV Push once  dextrose 50% Injectable 25 Gram(s) IV Push once  enoxaparin Injectable 40 milliGRAM(s) SubCutaneous every 12 hours  famotidine    Tablet 20 milliGRAM(s) Oral two times a day  hydroxychloroquine 200 milliGRAM(s) Oral every 12 hours  hydroxychloroquine   Oral   insulin glargine Injectable (LANTUS) 28 Unit(s) SubCutaneous at bedtime  insulin lispro (HumaLOG) corrective regimen sliding scale   SubCutaneous Before meals and at bedtime  insulin lispro Injectable (HumaLOG) 8 Unit(s) SubCutaneous three times a day before meals  methylPREDNISolone sodium succinate Injectable 40 milliGRAM(s) IV Push every 12 hours  potassium chloride    Tablet ER 20 milliEquivalent(s) Oral once    MEDICATIONS  (PRN):  acetaminophen   Tablet .. 650 milliGRAM(s) Oral every 4 hours PRN Temp greater or equal to 38.5C (101.3F)  benzocaine 15 mG/menthol 3.6 mG (Sugar-Free) Lozenge 1 Lozenge Oral three times a day PRN Sore Throat  dextrose 40% Gel 15 Gram(s) Oral once PRN Blood Glucose LESS THAN 70 milliGRAM(s)/deciliter  glucagon  Injectable 1 milliGRAM(s) IntraMuscular once PRN Glucose LESS THAN 70 milligrams/deciliter      Subjective:   Pt with past medical history significant for HTN and DM.  Pt presents with weakness, decreased appetite, malaise, fevers and diarrhea x10 days; hypoxic respiratory failure secondary to COVID+.   Unable to conduct a face to face interview or nutrition-focused physical exam due to limited contact restrictions related to the pt's medical condition and isolation precautions.  Unable to reach pt over room phone.  Unable to reach emergency contact.  Spoke with RN.   Pt was saturating 93% on 4L NC this AM.  Per documentation, pt is feeling better and appetite is returning to normal.  Per RN, pt with fair-good appetite; consuming 50-75% of meals with good tolerance.        Admitted Anthropometrics: (4/6) 95.3kg; Ht 157.5cm; BMI 38.4; IBW 50kg; %%    Weight Change: no additional weights documented for comparison     Nutrition Focused Physical Exam: Completed [   ]  Unable to complete [ x  ]    Estimated energy needs:         Nutrition Diagnosis:    [  ] No active nutrition diagnosis at this time  [  ] Current medical condition precludes nutrition intervention    Goal:    Recommendations:    Education:     Risk Level: High [   ] Moderate [   ] Low [   ] Admitting Diagnosis:   Patient is a 69y old  Female who presents with a chief complaint of COVID 19, Hypoxic Respiratory Failure (10 Apr 2020 10:06)      Consult: Yes [   ]  No [ x  ]    Reason for Initial Nutrition Assessment: LOS       PAST MEDICAL & SURGICAL HISTORY:  DM (diabetes mellitus)  HTN (hypertension)      Current Nutrition Order: DASH/TLC, Consistent CHO     PO Intake: Fair ~50%    GI Issues: pt with no recent complaints of n/v/d/c     Pain: pt with no recent complaints of pain     Skin Integrity: intact     Labs:   04-10    137  |  102  |  14  ----------------------------<  163<H>  3.7   |  24  |  0.48<L>    Ca    9.2      10 Apr 2020 07:32  Phos  4.0     04-10  Mg     2.3     04-10    TPro  6.6  /  Alb  3.0<L>  /  TBili  0.6  /  DBili  x   /  AST  24  /  ALT  25  /  AlkPhos  95  04-10    CAPILLARY BLOOD GLUCOSE      POCT Blood Glucose.: 141 mg/dL (10 Apr 2020 08:42)  POCT Blood Glucose.: 314 mg/dL (09 Apr 2020 21:12)  POCT Blood Glucose.: 220 mg/dL (09 Apr 2020 17:17)    Nutritionally Pertinent Lab Values: (4/10) CRP 3.66 (down-trending), POCT 141, 314, 220, 269 (likely steroid-induced in conjunction with previously uncontrolled DM), (4/8) A1c 12.8    Medications:  MEDICATIONS  (STANDING):  azithromycin   Tablet 250 milliGRAM(s) Oral daily  dextrose 5%. 1000 milliLiter(s) (50 mL/Hr) IV Continuous <Continuous>  dextrose 50% Injectable 12.5 Gram(s) IV Push once  dextrose 50% Injectable 25 Gram(s) IV Push once  dextrose 50% Injectable 25 Gram(s) IV Push once  enoxaparin Injectable 40 milliGRAM(s) SubCutaneous every 12 hours  famotidine    Tablet 20 milliGRAM(s) Oral two times a day  hydroxychloroquine 200 milliGRAM(s) Oral every 12 hours  hydroxychloroquine   Oral   insulin glargine Injectable (LANTUS) 28 Unit(s) SubCutaneous at bedtime  insulin lispro (HumaLOG) corrective regimen sliding scale   SubCutaneous Before meals and at bedtime  insulin lispro Injectable (HumaLOG) 8 Unit(s) SubCutaneous three times a day before meals  methylPREDNISolone sodium succinate Injectable 40 milliGRAM(s) IV Push every 12 hours  potassium chloride    Tablet ER 20 milliEquivalent(s) Oral once    MEDICATIONS  (PRN):  acetaminophen   Tablet .. 650 milliGRAM(s) Oral every 4 hours PRN Temp greater or equal to 38.5C (101.3F)  benzocaine 15 mG/menthol 3.6 mG (Sugar-Free) Lozenge 1 Lozenge Oral three times a day PRN Sore Throat  dextrose 40% Gel 15 Gram(s) Oral once PRN Blood Glucose LESS THAN 70 milliGRAM(s)/deciliter  glucagon  Injectable 1 milliGRAM(s) IntraMuscular once PRN Glucose LESS THAN 70 milligrams/deciliter      Subjective:   Pt with past medical history significant for HTN and DM.  Pt presents with weakness, decreased appetite, malaise, fevers and diarrhea x10 days; hypoxic respiratory failure secondary to COVID+.   Unable to conduct a face to face interview or nutrition-focused physical exam due to limited contact restrictions related to the pt's medical condition and isolation precautions.  Unable to reach pt over room phone.  Unable to reach emergency contact.  Nutrition history unable to be obtained.  Spoke with RN.   Pt was saturating 93% on 4L NC this AM.  Per documentation, pt is feeling better and appetite is returning to normal.  Per RN, pt with fair-good appetite; consuming 50-75% of meals with good tolerance.        Admitted Anthropometrics: (4/6) 95.3kg; Ht 157.5cm; BMI 38.4; IBW 50kg; %%    Weight Change: no additional weights documented for comparison     Nutrition Focused Physical Exam: Completed [   ]  Unable to complete [ x  ]    Estimated energy needs:   IBW used to calculate energy needs due to pt's current body weight exceeding 120% of IBW  Needs calculated for age and increased slightly secondary to COVID+/infection/inflammation   Energy: 1150-1350kcal (23-27cal/kg)  Protein: 55-65g pro (1.1-1.3g/kg pro)  Fluids per team.     Nutrition Diagnosis: Increased nutrient needs (kcal/pro) r/t increased demand for nutrients AEB COVID+/infection/inflammation     Goal: Pt to consistently meet at least 75% estimated nutrient needs     Recommendations:   1. Monitor PO intake  2. Honor food preferences  3. Appreciate continued assistance and encouragement with meals   4. Add Ensure Enlive x1/day (350kcal, 20g pro)    Education: unable to be completed     Risk Level: High [   ] Moderate [  x ] Low [   ] Admitting Diagnosis:   Patient is a 69y old  Female who presents with a chief complaint of COVID 19, Hypoxic Respiratory Failure (10 Apr 2020 10:06)      Consult: Yes [   ]  No [ x  ]    Reason for Initial Nutrition Assessment: LOS       PAST MEDICAL & SURGICAL HISTORY:  DM (diabetes mellitus)  HTN (hypertension)      Current Nutrition Order: DASH/TLC, Consistent CHO     PO Intake: Fair ~50%    GI Issues: pt with no recent complaints of n/v/d/c     Pain: pt with no recent complaints of pain     Skin Integrity: intact     Labs:   04-10    137  |  102  |  14  ----------------------------<  163<H>  3.7   |  24  |  0.48<L>    Ca    9.2      10 Apr 2020 07:32  Phos  4.0     04-10  Mg     2.3     04-10    TPro  6.6  /  Alb  3.0<L>  /  TBili  0.6  /  DBili  x   /  AST  24  /  ALT  25  /  AlkPhos  95  04-10    CAPILLARY BLOOD GLUCOSE      POCT Blood Glucose.: 141 mg/dL (10 Apr 2020 08:42)  POCT Blood Glucose.: 314 mg/dL (09 Apr 2020 21:12)  POCT Blood Glucose.: 220 mg/dL (09 Apr 2020 17:17)    Nutritionally Pertinent Lab Values: (4/10) CRP 3.66 (down-trending), POCT 141, 314, 220, 269 (likely steroid-induced in conjunction with previously uncontrolled DM), (4/8) A1c 12.8    Medications:  MEDICATIONS  (STANDING):  azithromycin   Tablet 250 milliGRAM(s) Oral daily  dextrose 5%. 1000 milliLiter(s) (50 mL/Hr) IV Continuous <Continuous>  dextrose 50% Injectable 12.5 Gram(s) IV Push once  dextrose 50% Injectable 25 Gram(s) IV Push once  dextrose 50% Injectable 25 Gram(s) IV Push once  enoxaparin Injectable 40 milliGRAM(s) SubCutaneous every 12 hours  famotidine    Tablet 20 milliGRAM(s) Oral two times a day  hydroxychloroquine 200 milliGRAM(s) Oral every 12 hours  hydroxychloroquine   Oral   insulin glargine Injectable (LANTUS) 28 Unit(s) SubCutaneous at bedtime  insulin lispro (HumaLOG) corrective regimen sliding scale   SubCutaneous Before meals and at bedtime  insulin lispro Injectable (HumaLOG) 8 Unit(s) SubCutaneous three times a day before meals  methylPREDNISolone sodium succinate Injectable 40 milliGRAM(s) IV Push every 12 hours  potassium chloride    Tablet ER 20 milliEquivalent(s) Oral once    MEDICATIONS  (PRN):  acetaminophen   Tablet .. 650 milliGRAM(s) Oral every 4 hours PRN Temp greater or equal to 38.5C (101.3F)  benzocaine 15 mG/menthol 3.6 mG (Sugar-Free) Lozenge 1 Lozenge Oral three times a day PRN Sore Throat  dextrose 40% Gel 15 Gram(s) Oral once PRN Blood Glucose LESS THAN 70 milliGRAM(s)/deciliter  glucagon  Injectable 1 milliGRAM(s) IntraMuscular once PRN Glucose LESS THAN 70 milligrams/deciliter      Subjective:   Pt with past medical history significant for HTN and DM.  Pt presents with weakness, decreased appetite, malaise, fevers and diarrhea x10 days; hypoxic respiratory failure secondary to COVID+.   Unable to conduct a face to face interview or nutrition-focused physical exam due to limited contact restrictions related to the pt's medical condition and isolation precautions.  Unable to reach pt over room phone.  Unable to reach emergency contact.  Nutrition history unable to be obtained.  Spoke with RN.   Pt was saturating 93% on 4L NC this AM.  Per documentation, pt is feeling better and appetite is returning to normal.  Per RN, pt with fair-good appetite; consuming 50-75% of meals with good tolerance.        Admitted Anthropometrics: (4/6) 95.3kg; Ht 157.5cm; BMI 38.4; IBW 50kg; %%    Weight Change: no additional weights documented for comparison     Nutrition Focused Physical Exam: Completed [   ]  Unable to complete [ x  ]    Estimated energy needs:   IBW used to calculate energy needs due to pt's current body weight exceeding 120% of IBW  Needs calculated for age and increased slightly secondary to COVID+/infection/inflammation   Energy: 1150-1350kcal (23-27cal/kg)  Protein: 55-65g pro (1.1-1.3g/kg pro)  Fluids per team.     Nutrition Diagnosis: Increased nutrient needs (kcal/pro) r/t increased demand for nutrients AEB COVID+/infection/inflammation     Goal: Pt to consistently meet at least 75% estimated nutrient needs     Recommendations:   1. Monitor PO intake  2. Honor food preferences  3. Appreciate continued assistance and encouragement with meals   4. Add Glucerna x1/day (220kcal, 10g pro)    Education: unable to be completed     Risk Level: High [   ] Moderate [  x ] Low [   ]

## 2020-04-10 NOTE — DISCHARGE NOTE PROVIDER - HOSPITAL COURSE
#Discharge: do not delete        Patient is 68 yo F with past medical history of HTN and Diabetes (not on medications) presented with a 10 day history of weakness, loss of appetite, malaise, subjective fevers, and diarrhea. Patient was admitted to hospital for acute hypoxic respiratory failure secondary to COVID-19.    Patient was febrile on presentation and required oxygen supplementation. She was started on azithromycin and hydrochloroquine on 4/6, to be completed on 4/10. Given elevated COVID inflammatory laboratory markers were elevated and chest xray findings consistent with COVID, decision made to treat with tocilizumab and steroids on 4/7. Patients fingersticks were elevated and HgbA1C was elevated to 13.2%, thus decision was made to start on insulin. Insulin regimen increased to 28units lantus at bedtime and lispro 8units before meals. Oxygen requirements decreased during stay to about 3L of nasal cannula. Patient appetite returned. Fingersticks improved on insulin regimen. Symptoms of shortness of breath and malaise improved during stay. Safe for discharge to [facility] for oxygen supplementation. During stay was on Lovenox BID, to continue at [facility].         Problem List/Main Diagnoses (system-based):     1.  COVID-19: treated with azithromycin and Plaquenil (4/6-4/10), toci 4/7, steroids 4/7-4/11, supplemental oxygen 2L     2. HTN- no home medications, didn't require medications in hospital    3. Diabetes Mellitus: HgbA1C 13.2%, started on insulin during hospital stay 2/2 elevated FS, better controlled on lantus 28u at night and lispro 8u before meals    4.  DVT PPX: continue on lovenox BID while hospitalized         Inpatient treatment course: treated with azithromycin and Plaquenil (4/6-4/10), toci 4/7, steroids 4/7-4/1, treated DM w/ lantus and lispro     New medications: azithromycin and Plaquenil (4/6-4/10), steroids 4/7-4/1, lantus 28u at night  and lispro 8u before meals     Labs to be followed outpatient: HgbA1C and fingersticks     Exam to be followed outpatient: pulmn exam , endocrine follow up outpatient

## 2020-04-10 NOTE — PHYSICAL THERAPY INITIAL EVALUATION ADULT - PERTINENT HX OF CURRENT PROBLEM, REHAB EVAL
Pt. is a 69 y.o female admitted wweakness, loss of appetite, malaise, subjective fevers, diarrhea. Pt. was found to be hypoxic on RA on admission and her PCR was + for COVID.

## 2020-04-10 NOTE — PROGRESS NOTE ADULT - PROBLEM SELECTOR PLAN 3
2/2 COVID-19    Improved, Continue supplemental oxygen as needed, dec O2 supplementation to 2L NC, monitor O2 saturation

## 2020-04-10 NOTE — PROGRESS NOTE ADULT - PROBLEM SELECTOR PLAN 6
F: None  E: Replete PRN  N: DASH/TLC  GI PPX: Pepcid  DVT PPX: Lovenox  Code: Full  Dispo: plan for d/c to [facility] today given requiring 2-4L NC, insulin teaching prior to d/c F: None  E: Replete PRN  N: DASH/TLC  GI PPX: Pepcid  DVT PPX: Lovenox  Code: Full  Dispo: plan for d/c to [facility] today given requiring 2-4L NC, insulin teaching prior to d/c, lovenox on discharge

## 2020-04-10 NOTE — PROGRESS NOTE ADULT - ASSESSMENT
69-year-old female with a past medical history of HTN and DM who presented with a 10-day history of weakness, loss of appetite, malaise, subjective fevers, and diarrhea. Admitted for acute hypoxic respiratory failure secondary to COVID-19.

## 2020-04-10 NOTE — PHYSICAL THERAPY INITIAL EVALUATION ADULT - PREDICTED DURATION OF THERAPY (DAYS/WKS), PT EVAL
Pt. would benefit from cont. PT follow up to improve endurance and functional mobility, prevent further deconditioning.

## 2020-04-10 NOTE — DISCHARGE NOTE PROVIDER - NSDCMRMEDTOKEN_GEN_ALL_CORE_FT
acetaminophen 325 mg oral tablet: 2 tab(s) orally every 4 hours, As needed, Temp greater or equal to 38.5C (101.3F)  azithromycin 250 mg oral tablet: 1 tab(s) orally once a day   enoxaparin 40 mg/0.4 mL injectable solution: 40 milligram(s) subcutaneously 2 times a day during hospitilization   famotidine 20 mg oral tablet: 1 tab(s) orally 2 times a day   insulin glargine 100 units/mL subcutaneous solution: 28 unit(s) subcutaneous once a day (at bedtime)   insulin lispro 100 units/mL injectable solution: 8 unit(s) injectable 3 times a day (before meals)   methylPREDNISolone sodium succinate 40 mg injection: 40 milligram(s) intravenously 2 times a day   Plaquenil 200 mg oral tablet: 2 tab(s) orally once a day

## 2020-04-10 NOTE — PROGRESS NOTE ADULT - REASON FOR ADMISSION
COVID 19, Hypoxic Respiratory Failure

## 2020-04-10 NOTE — DISCHARGE NOTE PROVIDER - NSDCCPCAREPLAN_GEN_ALL_CORE_FT
PRINCIPAL DISCHARGE DIAGNOSIS  Diagnosis: COVID-19  Assessment and Plan of Treatment: Patient was admited for shortness of breath, malaise, and requiring oxygen supplementation. Patient was found to be COVID+, with elevated inflammatory lab findings, and Chest Xray consistent with COVID. She was presented  treated with Azithromycin and Plaquenil (4/6-4/10), tocilizumab 4/7, steroids 4/7-4/11, and pepcid. Her oxygen requirements decreased over time and became afebrile. Her symptoms improved. Given oxygen requirements decreased to 3-4L oxygen nasal cannula and determined safe for discharge to [facility] for continued oxygen supplementation. She should continue azithromycin and plaquenil till 4/10, which was sent with her to [facility]. She should continue steriods twice daily till 4/11. She should continue to take tylenol as needed for fever, which was sent to [facility]. She should continue lovenox for prophylaxis in setting of recent COVID and elevated D-Dimer.      SECONDARY DISCHARGE DIAGNOSES  Diagnosis: Diabetes  Assessment and Plan of Treatment: Patient found to have elevated HgbA1C of 13.2%. Patient was started on insulin. Her fingersticks improved with lantus 28u at night and 8units before meals. She should follow up outpatient with PCP or endocrinologist for diabetes. PRINCIPAL DISCHARGE DIAGNOSIS  Diagnosis: COVID-19  Assessment and Plan of Treatment: Patient was admited for shortness of breath, malaise, and requiring oxygen supplementation. Patient was found to be COVID+, with elevated inflammatory lab findings, and Chest Xray consistent with COVID. She was presented  treated with Azithromycin and Plaquenil (4/6-4/10), tocilizumab 4/7, steroids 4/7-4/11, and pepcid.    The COVID test resulted from a nasal and oral (mouth) swab that was done earlier in your admission to [facility]. Your symptoms improved during your hospital stay. The treatment is supportive care, which means: rest, hydration, and maintaining a good healthy diet. You may take Tylenol if you experience any fevers and Robitussin for cough. Do not take Advil or Motrin. Please take your prescribed medications listed below as directed.  If you start experiencing extreme shortness of breath, difficulty breathing resulting in the inability to even walk, please visit an urgent care. Please maintain a 14 day quarantine at home (4/20/20) and until no fevers for at least 3 days .Should you have to leave your residence or come in contact with others, please wear a face mask and wash your hands thoroughly. The department of health will follow-up with you via phone, please do not go to your PCP while in self quarantine.   Wear a mask at all times should you have to be outdoors briefly - and avoid crowds, public spaces, and mass transit at all times during this quarantine.   Continue to wash your hands thoroughly and frequently. Please see the supplemented written instructions for further use.  Please follow-up with your primary care physician regularly after your discharge and self-quarantine (4/20/20)        SECONDARY DISCHARGE DIAGNOSES  Diagnosis: Diabetes  Assessment and Plan of Treatment: Patient found to have elevated HgbA1C of 13.2%. Patient was started on insulin. Her fingersticks improved with lantus 28u at night and 8units before meals. She should follow up outpatient with PCP or endocrinologist for diabetes.

## 2020-04-10 NOTE — PROGRESS NOTE ADULT - SUBJECTIVE AND OBJECTIVE BOX
Medicine Progress Note    Patient is a 69y old  Female who presents with a chief complaint of COVID 19, Hypoxic Respiratory Failure (09 Apr 2020 09:03)      SUBJECTIVE / OVERNIGHT EVENTS: No acute events overnight, insulin adjustments made 2/2 elevated FS.   Patient assessed at bedside this morning with full PPE. Reports she is feeling better. Difficulty sleeping secondary to disruptions in room. Denies cough, SOB, fever, chills. Reports return of appetite. Ambulated to chair and denies SOB on oxygen. Denies diarrhea or abdominal pain.      ADDITIONAL REVIEW OF SYSTEMS: as above    MEDICATIONS  (STANDING):  azithromycin   Tablet 250 milliGRAM(s) Oral daily  dextrose 5%. 1000 milliLiter(s) (50 mL/Hr) IV Continuous <Continuous>  dextrose 50% Injectable 12.5 Gram(s) IV Push once  dextrose 50% Injectable 25 Gram(s) IV Push once  dextrose 50% Injectable 25 Gram(s) IV Push once  enoxaparin Injectable 40 milliGRAM(s) SubCutaneous every 12 hours  famotidine    Tablet 20 milliGRAM(s) Oral two times a day  hydroxychloroquine 200 milliGRAM(s) Oral every 12 hours  hydroxychloroquine   Oral   insulin glargine Injectable (LANTUS) 28 Unit(s) SubCutaneous at bedtime  insulin lispro (HumaLOG) corrective regimen sliding scale   SubCutaneous Before meals and at bedtime  insulin lispro Injectable (HumaLOG) 8 Unit(s) SubCutaneous three times a day before meals  methylPREDNISolone sodium succinate Injectable 40 milliGRAM(s) IV Push every 12 hours    MEDICATIONS  (PRN):  acetaminophen   Tablet .. 650 milliGRAM(s) Oral every 4 hours PRN Temp greater or equal to 38.5C (101.3F)  benzocaine 15 mG/menthol 3.6 mG (Sugar-Free) Lozenge 1 Lozenge Oral three times a day PRN Sore Throat  dextrose 40% Gel 15 Gram(s) Oral once PRN Blood Glucose LESS THAN 70 milliGRAM(s)/deciliter  glucagon  Injectable 1 milliGRAM(s) IntraMuscular once PRN Glucose LESS THAN 70 milligrams/deciliter    CAPILLARY BLOOD GLUCOSE      POCT Blood Glucose.: 141 mg/dL (10 Apr 2020 08:42)  POCT Blood Glucose.: 314 mg/dL (09 Apr 2020 21:12)  POCT Blood Glucose.: 220 mg/dL (09 Apr 2020 17:17)  POCT Blood Glucose.: 269 mg/dL (09 Apr 2020 12:25)    I&O's Summary      PHYSICAL EXAM:  Vital Signs Last 24 Hrs  T(C): 36.8 (10 Apr 2020 05:00), Max: 36.8 (09 Apr 2020 23:58)  T(F): 98.2 (10 Apr 2020 05:00), Max: 98.2 (09 Apr 2020 23:58)  HR: 61 (10 Apr 2020 05:00) (61 - 71)  BP: 137/82 (10 Apr 2020 05:00) (133/81 - 137/82)  BP(mean): --  RR: 18 (10 Apr 2020 05:00) (18 - 18)  SpO2: 93% (10 Apr 2020 05:00) (93% - 93%)    CONSTITUTIONAL: NAD, well-developed, on 2L NC  ENMT: Moist oral mucosa, no pharyngeal injection or exudates; normal dentition  RESPIRATORY: Normal respiratory effort; completing full sentences   ABDOMEN: Nontender to palpation, no rebound/guarding; No hepatosplenomegaly  PSYCH: A+O to person, place, and time; affect appropriate  EXT: no edema/ erythema/tenderness   SKIN: No rashes; no palpable lesions    LABS:                        14.5   8.58  )-----------( 439      ( 10 Apr 2020 07:32 )             45.4     04-10    137  |  102  |  14  ----------------------------<  163<H>  3.7   |  24  |  0.48<L>    Ca    9.2      10 Apr 2020 07:32  Phos  4.0     04-10  Mg     2.3     04-10    TPro  6.6  /  Alb  3.0<L>  /  TBili  0.6  /  DBili  x   /  AST  24  /  ALT  25  /  AlkPhos  95  04-10    COVID-19 PCR: Detected (06 Apr 2020 15:20)    RADIOLOGY & ADDITIONAL TESTS:  Imaging from Last 24 Hours:    Electrocardiogram/QTc Interval:    COORDINATION OF CARE:  Care Discussed with Consultants/Other Providers:

## 2020-04-16 DIAGNOSIS — E11.65 TYPE 2 DIABETES MELLITUS WITH HYPERGLYCEMIA: ICD-10-CM

## 2020-04-16 DIAGNOSIS — U07.1 COVID-19: ICD-10-CM

## 2020-04-16 DIAGNOSIS — R06.02 SHORTNESS OF BREATH: ICD-10-CM

## 2020-04-16 DIAGNOSIS — R65.20 SEVERE SEPSIS WITHOUT SEPTIC SHOCK: ICD-10-CM

## 2020-04-16 DIAGNOSIS — A41.89 OTHER SPECIFIED SEPSIS: ICD-10-CM

## 2020-04-16 DIAGNOSIS — I10 ESSENTIAL (PRIMARY) HYPERTENSION: ICD-10-CM

## 2020-04-16 DIAGNOSIS — J96.01 ACUTE RESPIRATORY FAILURE WITH HYPOXIA: ICD-10-CM

## 2022-05-28 NOTE — PROGRESS NOTE ADULT - I WAS PHYSICALLY PRESENT FOR THE KEY PORTIONS OF THE EVALUATION AND MANAGEMENT (E/M) SERVICE PROVIDED.  I AGREE WITH THE ABOVE HISTORY, PHYSICAL, AND PLAN WHICH I HAVE REVIEWED AND EDITED WHERE APPROPRIATE
End of Shift Note    Bedside shift change report given to Ubaldo NAVARRO (oncoming nurse) by Rito Chris (offgoing nurse). Report included the following information SBAR, Kardex, Intake/Output, MAR, Recent Results, Med Rec Status and Cardiac Rhythm NSR    Shift worked:  [id number]     Shift summary and any significant changes:     Patient slept most of shift, became agitated when RN did vital signs or tried to bladder scan pt. Pt told RN she doesn't void very much at night, np notified     Concerns for physician to address:  NPO?? Zone phone for oncoming shift:   [phone]       Activity:  Activity Level: Up with Assistance  Number times ambulated in hallways past shift: 0  Number of times OOB to chair past shift: 0    Cardiac:   Cardiac Monitoring: Yes           Access:   Current line(s): PIV     Genitourinary:   Urinary status: external catheter    Respiratory:   O2 Device: None (Room air)  Chronic home O2 use?: NO  Incentive spirometer at bedside: NO     GI:  Last Bowel Movement Date: 05/15/21  Current diet:  DIET NPO  Passing flatus: YES  Tolerating current diet: YES       Pain Management:   Patient states pain is manageable on current regimen: YES    Skin:  Samy Score: 19  Interventions: increase time out of bed    Patient Safety:  Fall Score:  Total Score: 2  Interventions: bed/chair alarm and assistive device (walker, cane, etc)       Length of Stay:  Expected LOS: - - -  [place] [address] [place]
Statement Selected
no
Statement Selected
Statement Selected

## 2024-06-05 NOTE — ED PROVIDER NOTE - OBJECTIVE STATEMENT
68 y/o F PMHx HTN and DM (not on any meds) presents to the ED with c/o 10 D of generalized weakness, loss of appetite, generalized malaise, and subjective fevers. She came in w/ these complaints. Denies CP, SOB, cough. She also reports mild nonbloody diarrhea. oral 68 y/o F PMHx HTN and "pre-diabetes" (denies any meds) presents to the ED with 10 Days of generalized weakness, loss of appetite, generalized malaise, and subjective fevers. Denies CP, SOB, cough. She also reports some nonbloody diarrhea.

## 2024-08-16 NOTE — ED PROVIDER NOTE - PHYSICAL EXAMINATION
Missael Gaona, APRN   Call  for any questions or concerns.  Central [phone] for imaging and lab work  
VITAL SIGNS: I have reviewed nursing notes and confirm.  CONSTITUTIONAL: Well-developed; well-nourished; in no acute distress.   SKIN:  warm and dry, no acute rash.   HEAD:  normocephalic, atraumatic.  EYES: EOM intact; conjunctiva and sclera clear.  ENT: No nasal discharge; airway clear.   NECK: Supple; non tender.  CARD: S1, S2 normal; no murmurs, gallops, or rubs. Regular rate and rhythm.   RESP:  Mildly tachypneic w/ crackles and Rhonchi bilaterally.   ABD: Normal bowel sounds; soft; non-distended; non-tender; no guarding/ rebound.  EXT: Normal ROM. No clubbing, cyanosis or edema. 2+ pulses to b/l ue/le.  NEURO: Alert, oriented, grossly unremarkable  PSYCH: Cooperative, mood and affect appropriate.

## 2025-05-20 ENCOUNTER — NON-APPOINTMENT (OUTPATIENT)
Age: 75
End: 2025-05-20

## 2025-05-20 ENCOUNTER — APPOINTMENT (OUTPATIENT)
Dept: OPHTHALMOLOGY | Facility: CLINIC | Age: 75
End: 2025-05-20
Payer: MEDICARE

## 2025-05-20 PROCEDURE — 92004 COMPRE OPH EXAM NEW PT 1/>: CPT | Mod: 25

## 2025-05-20 PROCEDURE — 92250 FUNDUS PHOTOGRAPHY W/I&R: CPT

## 2025-05-20 PROCEDURE — 67028 INJECTION EYE DRUG: CPT | Mod: RT

## 2025-05-23 ENCOUNTER — APPOINTMENT (OUTPATIENT)
Dept: OPHTHALMOLOGY | Facility: CLINIC | Age: 75
End: 2025-05-23